# Patient Record
Sex: MALE | Race: WHITE | Employment: OTHER | URBAN - METROPOLITAN AREA
[De-identification: names, ages, dates, MRNs, and addresses within clinical notes are randomized per-mention and may not be internally consistent; named-entity substitution may affect disease eponyms.]

---

## 2017-08-08 ENCOUNTER — HOSPITAL ENCOUNTER (OUTPATIENT)
Dept: WOUND CARE | Age: 82
Discharge: HOME | End: 2017-08-08
Payer: MEDICARE

## 2017-08-08 PROCEDURE — 29580 STRAPPING UNNA BOOT: CPT

## 2017-08-08 PROCEDURE — 99201 HC NEW PT LEVEL I W PROC: CPT

## 2017-08-14 PROCEDURE — 29580 STRAPPING UNNA BOOT: CPT

## 2017-08-21 PROCEDURE — 29580 STRAPPING UNNA BOOT: CPT

## 2017-08-28 PROCEDURE — 29580 STRAPPING UNNA BOOT: CPT

## 2017-09-06 ENCOUNTER — HOSPITAL ENCOUNTER (OUTPATIENT)
Dept: WOUND CARE | Age: 82
Discharge: HOME | End: 2017-09-06
Payer: MEDICARE

## 2017-09-06 PROCEDURE — 29580 STRAPPING UNNA BOOT: CPT

## 2017-09-13 PROCEDURE — 29580 STRAPPING UNNA BOOT: CPT

## 2017-09-20 PROCEDURE — 97602 WOUND(S) CARE NON-SELECTIVE: CPT

## 2017-09-21 ENCOUNTER — HOSPITAL ENCOUNTER (EMERGENCY)
Age: 82
Discharge: HOME | End: 2017-09-22
Attending: EMERGENCY MEDICINE
Payer: MEDICARE

## 2017-09-21 DIAGNOSIS — S30.0XXA CONTUSION OF LOWER BACK, INITIAL ENCOUNTER: ICD-10-CM

## 2017-09-21 DIAGNOSIS — R55 SYNCOPE AND COLLAPSE: Primary | ICD-10-CM

## 2017-09-21 DIAGNOSIS — I89.0 LYMPHEDEMA: ICD-10-CM
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
## 2017-09-21 PROCEDURE — 82550 ASSAY OF CK (CPK): CPT | Performed by: EMERGENCY MEDICINE

## 2017-09-21 PROCEDURE — 85025 COMPLETE CBC W/AUTO DIFF WBC: CPT | Performed by: EMERGENCY MEDICINE

## 2017-09-21 PROCEDURE — 99284 EMERGENCY DEPT VISIT MOD MDM: CPT

## 2017-09-21 PROCEDURE — 93005 ELECTROCARDIOGRAM TRACING: CPT

## 2017-09-21 PROCEDURE — 80053 COMPREHEN METABOLIC PANEL: CPT | Performed by: EMERGENCY MEDICINE

## 2017-09-22 ENCOUNTER — APPOINTMENT (OUTPATIENT)
Dept: CT IMAGING | Age: 82
End: 2017-09-22
Attending: EMERGENCY MEDICINE
Payer: MEDICARE

## 2017-09-22 VITALS
HEART RATE: 88 BPM | SYSTOLIC BLOOD PRESSURE: 131 MMHG | TEMPERATURE: 97.6 F | WEIGHT: 215 LBS | OXYGEN SATURATION: 96 % | HEIGHT: 74 IN | BODY MASS INDEX: 27.59 KG/M2 | DIASTOLIC BLOOD PRESSURE: 69 MMHG | RESPIRATION RATE: 18 BRPM

## 2017-09-22 LAB
ALBUMIN SERPL-MCNC: 3.1 G/DL (ref 3.4–5)
ALBUMIN/GLOB SERPL: 0.8 {RATIO} (ref 0.8–1.7)
ALP SERPL-CCNC: 71 U/L (ref 45–117)
ALT SERPL-CCNC: 15 U/L (ref 16–61)
ANION GAP SERPL CALC-SCNC: 7 MMOL/L (ref 3–18)
AST SERPL-CCNC: 19 U/L (ref 15–37)
BASOPHILS # BLD: 0 K/UL (ref 0–0.1)
BASOPHILS NFR BLD: 0 % (ref 0–3)
BILIRUB SERPL-MCNC: 0.9 MG/DL (ref 0.2–1)
BUN SERPL-MCNC: 18 MG/DL (ref 7–18)
BUN/CREAT SERPL: 20 (ref 12–20)
CALCIUM SERPL-MCNC: 8.8 MG/DL (ref 8.5–10.1)
CHLORIDE SERPL-SCNC: 103 MMOL/L (ref 100–108)
CK MB CFR SERPL CALC: ABNORMAL % (ref 0–4)
CK MB SERPL-MCNC: <1 NG/ML (ref 5–25)
CK SERPL-CCNC: 23 U/L (ref 39–308)
CO2 SERPL-SCNC: 29 MMOL/L (ref 21–32)
CREAT SERPL-MCNC: 0.88 MG/DL (ref 0.6–1.3)
DIFFERENTIAL METHOD BLD: ABNORMAL
EOSINOPHIL # BLD: 0.1 K/UL (ref 0–0.4)
EOSINOPHIL NFR BLD: 1 % (ref 0–5)
ERYTHROCYTE [DISTWIDTH] IN BLOOD BY AUTOMATED COUNT: 13.2 % (ref 11.6–14.5)
GLOBULIN SER CALC-MCNC: 3.8 G/DL (ref 2–4)
GLUCOSE SERPL-MCNC: 119 MG/DL (ref 74–99)
HCT VFR BLD AUTO: 37.8 % (ref 36–48)
HGB BLD-MCNC: 12.8 G/DL (ref 13–16)
LYMPHOCYTES # BLD: 0.8 K/UL (ref 0.8–3.5)
LYMPHOCYTES NFR BLD: 6 % (ref 20–51)
MCH RBC QN AUTO: 34.3 PG (ref 24–34)
MCHC RBC AUTO-ENTMCNC: 33.9 G/DL (ref 31–37)
MCV RBC AUTO: 101.3 FL (ref 74–97)
MONOCYTES # BLD: 0.9 K/UL (ref 0–1)
MONOCYTES NFR BLD: 7 % (ref 2–9)
NEUTS SEG # BLD: 11.3 K/UL (ref 1.8–8)
NEUTS SEG NFR BLD: 86 % (ref 42–75)
PLATELET # BLD AUTO: 204 K/UL (ref 135–420)
PMV BLD AUTO: 10.3 FL (ref 9.2–11.8)
POTASSIUM SERPL-SCNC: 4.2 MMOL/L (ref 3.5–5.5)
PROT SERPL-MCNC: 6.9 G/DL (ref 6.4–8.2)
RBC # BLD AUTO: 3.73 M/UL (ref 4.7–5.5)
RBC MORPH BLD: ABNORMAL
SODIUM SERPL-SCNC: 139 MMOL/L (ref 136–145)
TROPONIN I SERPL-MCNC: <0.02 NG/ML (ref 0–0.06)
WBC # BLD AUTO: 13.1 K/UL (ref 4.6–13.2)

## 2017-09-22 PROCEDURE — 74011636320 HC RX REV CODE- 636/320: Performed by: EMERGENCY MEDICINE

## 2017-09-22 PROCEDURE — 70450 CT HEAD/BRAIN W/O DYE: CPT

## 2017-09-22 PROCEDURE — 71275 CT ANGIOGRAPHY CHEST: CPT

## 2017-09-22 RX ORDER — ACETAMINOPHEN 325 MG/1
650 TABLET ORAL
Qty: 20 TAB | Refills: 0 | Status: SHIPPED | OUTPATIENT
Start: 2017-09-22

## 2017-09-22 RX ADMIN — IOPAMIDOL 100 ML: 755 INJECTION, SOLUTION INTRAVENOUS at 02:17

## 2017-09-22 NOTE — DISCHARGE INSTRUCTIONS
Bruises: Care Instructions  Your Care Instructions    Bruises occur when small blood vessels under the skin tear or rupture, most often from a twist, bump, or fall. Blood leaks into tissues under the skin and causes a black-and-blue spot that often turns colors, including purplish black, reddish blue, or yellowish green, as the bruise heals. Bruises hurt, but most are not serious and will go away on their own within 2 to 4 weeks. Sometimes, gravity causes them to spread down the body. A leg bruise usually will take longer to heal than a bruise on the face or arms. Follow-up care is a key part of your treatment and safety. Be sure to make and go to all appointments, and call your doctor if you are having problems. Its also a good idea to know your test results and keep a list of the medicines you take. How can you care for yourself at home? · Take pain medicines exactly as directed. ¨ If the doctor gave you a prescription medicine for pain, take it as prescribed. ¨ If you are not taking a prescription pain medicine, ask your doctor if you can take an over-the-counter medicine. · Put ice or a cold pack on the area for 10 to 20 minutes at a time. Put a thin cloth between the ice and your skin. · If you can, prop up the bruised area on pillows as much as possible for the next few days. Try to keep the bruise above the level of your heart. When should you call for help? Call your doctor now or seek immediate medical care if:  · You have signs of infection, such as:  ¨ Increased pain, swelling, warmth, or redness. ¨ Red streaks leading from the bruise. ¨ Pus draining from the bruise. ¨ A fever. · You have a bruise on your leg and signs of a blood clot, such as:  ¨ Increasing redness and swelling along with warmth, tenderness, and pain in the bruised area. ¨ Pain in your calf, back of the knee, thigh, or groin. ¨ Redness and swelling in your leg or groin. · Your pain gets worse.   Watch closely for changes in your health, and be sure to contact your doctor if:  · You do not get better as expected. Where can you learn more? Go to http://stephanie-florentin.info/. Enter [phone] in the search box to learn more about \"Bruises: Care Instructions. \"  Current as of: March 20, 2017  Content Version: 11.3  © 7759-9646 [employer]. Care instructions adapted under license by [employer] (which disclaims liability or warranty for this information). If you have questions about a medical condition or this instruction, always ask your healthcare professional. [place] [address] any warranty or liability for your use of this information. Lymphedema: Care Instructions  Your Care Instructions  Lymphedema is fluid that builds up in the arms or legs. It is often caused by surgery to remove lymph nodes during cancer treatment, especially breast cancer surgery, which can cause fluid to build up in the arm. It can happen after radiation treatment to an area that involves lymph nodes. It also can be caused by a fractured bone or surgery to fix a fracture. And some medicines also can cause lymphedema. Some people get it for unknown reasons. Normally, lymph nodes trap bacteria and other substances as fluid flows through them. Then, the white cells in the body's defense, or immune, system can destroy the substances. But if there are few or no lymph nodes--or if the lymph system in an arm or leg has been damaged--fluid can build up in the affected arm or leg. You can take simple steps at home to help treat or prevent fluid buildup. Treatment may include raising the arm or leg to let gravity drain the fluid. You also can wear compression stockings or sleeves. Follow-up care is a key part of your treatment and safety. Be sure to make and go to all appointments, and call your doctor if you are having problems.  Its also a good idea to know your test results and keep a list of the medicines you take. How can you care for yourself at home? · Wear a compression stocking or sleeve as your doctor suggests. It can help keep fluid from pooling in an arm or leg. Wear it during air travel. · Prop up the swollen arm or leg on a pillow anytime you sit or lie down. Try to keep it above the level of your heart. This will help reduce swelling. · Avoid crossing your legs if your legs are swollen. · Get some exercise on most days of the week. Increase the intensity of exercise slowly. Water aerobics can help reduce swelling by helping fluid move around. Wear your compression stocking or sleeve during exercise, but not during water exercise. · See a physical therapist. He or she can teach you how to do self-massage to help fluid move around. You also can learn what activities would be best for you. · Keep your feet clean and wear clean socks or stockings every day. Check your feet often for signs of infection, such as redness or heat. Do not walk barefoot. · If you have had lymph nodes removed from under your arm:  ¨ Do not have blood drawn from the arm on the side of the lymph node surgery. ¨ Do not allow a blood pressure cuff to be placed on that arm. If you are in the hospital, make sure your nurse and other hospital staff know of your condition. ¨ Wear gloves when gardening or doing other activities that may lead to cuts on your fingers or hands. · If you have had lymph nodes removed from your groin:  ¨ Bathe your feet daily in lukewarm, not hot, water. Use a mild soap that has a moisturizer, or use a moisturizer separately. ¨ Check your feet for blisters or cuts. ¨ Wear comfortable and supportive shoes that fit properly. ¨ Wear the correct size of panty hose and stockings. Avoid garters or knee-high or thigh-high stockings. · Ask your doctor how to treat any cuts, scratches, insect bites, or other injuries that may occur.   · Use sunscreen and insect repellent when outdoors to protect your skin from sunburn and insect bites. · Wear medical alert jewelry that says you have lymphedema. You can buy these at most drugstores and on the Internet. When should you call for help? Call your doctor now or seek immediate medical care if:  · You have signs of infection, such as:  ¨ Increased pain, swelling, warmth, or redness. ¨ Red streaks leading from the area of lymph node surgery or radiation. ¨ Pus draining from the area of surgery or radiation. ¨ A fever. · You have a feeling of tightness or swelling in or around your arm, hand, leg, or foot. · You have pain, weakness that keeps getting worse, or a \"pins-and-needles\" feeling. Watch closely for changes in your health, and be sure to contact your doctor if:  · You continue to have fluid buildup even with home treatment. Where can you learn more? Go to http://stephanieCollision Hubflorentin.info/. Enter V398 in the search box to learn more about \"Lymphedema: Care Instructions. \"  Current as of: July 26, 2016  Content Version: 11.3  © 0828-6487 [employer]. Care instructions adapted under license by [email or web address] (which disclaims liability or warranty for this information). If you have questions about a medical condition or this instruction, always ask your healthcare professional. [place] [address] any warranty or liability for your use of this information. Fainting: Care Instructions  Your Care Instructions    When you faint, or pass out, you lose consciousness for a short time. A brief drop in blood flow to the brain often causes it. When you fall or lie down, more blood flows to your brain and you regain consciousness. Emotional stress, pain, or overheating--especially if you have been standing--can make you faint. In these cases, fainting is usually not serious. But fainting can be a sign of a more serious problem. Your doctor may want you to have more tests to rule out other causes.   The treatment you need depends on the reason why you fainted. The doctor has checked you carefully, but problems can develop later. If you notice any problems or new symptoms, get medical treatment right away. Follow-up care is a key part of your treatment and safety. Be sure to make and go to all appointments, and call your doctor if you are having problems. It's also a good idea to know your test results and keep a list of the medicines you take. How can you care for yourself at home? · Drink plenty of fluids to prevent dehydration. If you have kidney, heart, or liver disease and have to limit fluids, talk with your doctor before you increase your fluid intake. When should you call for help? Call 911 anytime you think you may need emergency care. For example, call if:  · You have symptoms of a heart problem. These may include:  ¨ Chest pain or pressure. ¨ Severe trouble breathing. ¨ A fast or irregular heartbeat. ¨ Lightheadedness or sudden weakness. ¨ Coughing up pink, foamy mucus. ¨ Passing out. After you call 911, the  may tell you to chew 1 adult-strength or 2 to 4 low-dose aspirin. Wait for an ambulance. Do not try to drive yourself. · You have symptoms of a stroke. These may include:  ¨ Sudden numbness, tingling, weakness, or loss of movement in your face, arm, or leg, especially on only one side of your body. ¨ Sudden vision changes. ¨ Sudden trouble speaking. ¨ Sudden confusion or trouble understanding simple statements. ¨ Sudden problems with walking or balance. ¨ A sudden, severe headache that is different from past headaches. · You passed out (lost consciousness) again. Watch closely for changes in your health, and be sure to contact your doctor if:  · You do not get better as expected. Where can you learn more? Go to http://stephanie-florentin.info/. Enter B821 in the search box to learn more about \"Fainting: Care Instructions. \"  Current as of: March 20, 2017  Content Version: 11.3  © 4841-1515 [email or web address]. Care instructions adapted under license by [employer] (which disclaims liability or warranty for this information). If you have questions about a medical condition or this instruction, always ask your healthcare professional. [address] any warranty or liability for your use of this information.

## 2017-09-22 NOTE — ED NOTES
Patient up to the bathroom, shuffled gait with small steps. Pt returned to bed and placed on cardiac monitor.

## 2017-09-22 NOTE — ED TRIAGE NOTES
Patient states that he is seen at the wound clinic and states that the wraps on his legs are too tight. Per son, patient was in the backyard and was found by a neighbor. Believed to have had a syncopal episode. Unaware if patient hit head. Sepsis Screening completed    (  )Patient meets SIRS criteria. ( x )Patient does not meet SIRS criteria.       SIRS Criteria is achieved when two or more of the following are present   Temperature < 96.8°F (36°C) or > 100.9°F (38.3°C)   Heart Rate > 90 beats per minute   Respiratory Rate > 20 breaths per minute   WBC count > 12,000 or <4,000 or > 10% bands

## 2017-09-22 NOTE — ED PROVIDER NOTES
HPI Comments: 12:31 AM    Marj López is a 80 y.o. male with pertinent PMHx of hypercholesteremia and skin CA presenting ambulatory to the ED c/o unwitnessed syncopal episode x this afternoon. Pt was found laying in his backyard by his neighbor. Pt was acting normally earlier today. Pt has been unable to walk independently since his fall. Pt's son states that the pt has been \"shuffling\" and complaining of no feeling in his legs. Pt states that he believes his sxs are due to his \"legs being rapped too tight\", left > right, as he is currently in would care for founds to bilateral legs. Pt has the dressings re-placed once per week. Pt denies any history of cardiac disease, stroke or heart failure. Pt is unsure of his family hx. Pt specifically denies any CP, SOB, N/V/D, headache or fever/chills. PCP: Luis Barrientos MD  Social Hx: - tobacco use, - alcohol use, - illicit drug use    There are no other complaints, changes, or physical findings at this time. The history is provided by the patient. No  was used. Past Medical History:   Diagnosis Date    Arthritis     Cancer ([facility] [address]     skin cancer, squamous cell    H/O seasonal allergies     Hypercholesteremia        Past Surgical History:   Procedure Laterality Date    HX CATARACT REMOVAL Bilateral     HX HERNIA REPAIR Bilateral 2007    HX TONSILLECTOMY           History reviewed. No pertinent family history. Social History     Social History    Marital status:      Spouse name: N/A    Number of children: N/A    Years of education: N/A     Occupational History    Not on file.      Social History Main Topics    Smoking status: Former Smoker     Quit date: 7/15/2005    Smokeless tobacco: Never Used    Alcohol use No    Drug use: No    Sexual activity: Not Currently     Other Topics Concern    Not on file     Social History Narrative         ALLERGIES: Cephalexin    Review of Systems   Constitutional: Negative for chills and fever. Respiratory: Negative for shortness of breath. Cardiovascular: Negative for chest pain. Gastrointestinal: Negative for nausea and vomiting. Skin: Positive for wound (bilateral lower legs). Neurological: Positive for syncope. Negative for headaches. All other systems reviewed and are negative. Vitals:    09/21/17 2330 09/22/17 0015 09/22/17 0045 09/22/17 0115   BP:       Pulse: 89 98 94 92   Resp: 18 21 18 13   Temp:       SpO2:  96% 97% 98%   Weight:       Height:                Physical Exam   Constitutional: He is oriented to person, place, and time. He appears well-developed and well-nourished. Elderly gentleman who is comfortable and well appearing. Nontoxic. HENT:   Head: Normocephalic and atraumatic. Eyes: EOM are normal. Pupils are equal, round, and reactive to light. Right conjunctiva is injected. No scleral icterus. No pallor   Neck: Normal range of motion. Neck supple. Cardiovascular: Normal rate, normal heart sounds and intact distal pulses. All toes initially with delayed cap refill of ~5 seconds (after dressing was removed, cap refill was 2 seconds). Pulmonary/Chest: Effort normal and breath sounds normal. No respiratory distress. Abdominal: Soft. Bowel sounds are normal. He exhibits no distension. There is no tenderness. Musculoskeletal: He exhibits edema. LOWER EXTREMITIES  Bilateral lower extremity edema with trace erythema to the anterior surface of the left leg. Large patch of erythema to the anterior portion of the right leg, as well as a small patch to the left lateral and prominent posterior calf. There is no drainage or malodor. Neurological: He is alert and oriented to person, place, and time. Skin: Skin is warm and dry. No rash noted. [place] [facility] [place]   Psychiatric: He has a normal mood and affect. His behavior is normal.   Nursing note and vitals reviewed.      RESULTS:    CARDIAC MONITOR NOTE:  Cardiac Rhythm: NSR  Rate: 93 bpm     PULSE OXIMETRY NOTE:  Pulse-ox is 96% on RA  Interpretation: NML       EKG interpretation: (Preliminary)  NSR at 87 bpm. Nml ST segments. No arrhythmia. EKG read by Chun. August Gill MD at [phone]     CT Results  (Last 48 hours)               09/22/17 0240  CTA CHEST W OR W WO CONT Final result    Impression:  IMPRESSION:       1. No evidence of pulmonary embolism. 2.  Diffuse mild bilateral interstitial coarsening throughout the lung fields   most consistent with chronic interstitial lung disease. 3. No focal consolidation suggests an active infiltrate. 4. Cholelithiasis. 5. Posterior right seventh rib fracture of appears to be old or subacute. Narrative:  EXAM: CTA chest       INDICATION: Chest pain. COMPARISON: None       TECHNIQUE: Axial CT imaging from the thoracic inlet through the diaphragm with   intravenous contrast. Coronal and sagittal MIP reformats were generated. Dose   reduction techniques used: automated exposure control, adjustment of the mAs   and/or kVp according to patient size, and iterative reconstruction techniques. _______________       FINDINGS:       EXAM QUALITY: Adequate. PULMONARY ARTERIES: No evidence of pulmonary embolism. MEDIASTINUM: Normal heart size. No evidence of right heart strain. There is   atherosclerotic plaque throughout the thoracic aorta. No pericardial effusion. LUNGS: There is diffuse mild bilateral interstitial coarsening most pronounced   peripherally and the lung bases. PLEURA: Normal.       AIRWAY: Normal.       LYMPH NODES: No enlarged nodes. UPPER ABDOMEN: 3.6 cm gallstone is noted. OTHER: No acute or aggressive osseous abnormalities identified. There is a   posterior right seventh rib fracture. There is osteopenia and degenerative   change throughout the lumbar spine.  There is a 1.1 cm left thyroid low-density   nodule.       _______________ 09/22/17 0236  CT HEAD WO CONT Final result    Impression:  IMPRESSION:   Degraded study. Mild cerebral volume loss and periventricular diminished attenuation consistent   with microvascular disease. 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 No acute intracranial abnormality identified. Narrative:  EXAM: CT head       INDICATION: Syncope. COMPARISON: None. TECHNIQUE: Axial CT imaging of the head was performed without intravenous   contrast. Dose reduction techniques used: automated exposure control, adjustment   of the mAs and/or kVp according to patient size, and iterative reconstruction   techniques. _______________       FINDINGS: Slightly limits evaluation particularly at the skull base. BRAIN AND POSTERIOR FOSSA: There is mild cerebral volume loss with prominence of   the lateral and the third ventricles. The cortical sulci are widened   appropriately. There is mild periventricular and central white matter areas of   diminished attenuation. There is a small area of encephalomalacia posterior   right frontal lobe. No acute territorial defect is seen. There is no acute   hemorrhage. EXTRA-AXIAL SPACES AND MENINGES: There are no abnormal extra-axial fluid   collections. CALVARIUM: Intact. SINUSES: Clear. OTHER: None.       _______________                  Labs Reviewed   CBC WITH AUTOMATED DIFF - Abnormal; Notable for the following:        Result Value    RBC 3.73 (*)     HGB 12.8 (*)     .3 (*)     MCH 34.3 (*)     NEUTROPHILS 86 (*)     LYMPHOCYTES 6 (*)     ABS.  NEUTROPHILS 11.3 (*)     All other components within normal limits   METABOLIC PANEL, COMPREHENSIVE - Abnormal; Notable for the following:     Glucose 119 (*)     ALT (SGPT) 15 (*)     Albumin 3.1 (*)     All other components within normal limits   CARDIAC PANEL,(CK, CKMB & TROPONIN) - Abnormal; Notable for the following:     CK 23 (*)     All other components within normal limits       Recent Results (from the past 12 hour(s))   EKG, 12 LEAD, INITIAL    Collection Time: 09/21/17 11:22 PM   Result Value Ref Range    Ventricular Rate 87 BPM    Atrial Rate 87 BPM    P-R Interval 192 ms    QRS Duration 88 ms    Q-T Interval 360 ms    QTC Calculation (Bezet) 433 ms    Calculated P Axis 64 degrees    Calculated R Axis -29 degrees    Calculated T Axis 38 degrees    Diagnosis       Normal sinus rhythm  Normal ECG  When compared with ECG of 15-JUL-2016 12:54,  premature ventricular complexes are no longer present     CBC WITH AUTOMATED DIFF    Collection Time: 09/21/17 11:30 PM   Result Value Ref Range    WBC 13.1 4.6 - 13.2 K/uL    RBC 3.73 (L) 4.70 - 5.50 M/uL    HGB 12.8 (L) 13.0 - 16.0 g/dL    HCT 37.8 36.0 - 48.0 %    .3 (H) 74.0 - 97.0 FL    MCH 34.3 (H) 24.0 - 34.0 PG    MCHC 33.9 31.0 - 37.0 g/dL    RDW 13.2 11.6 - 14.5 %    PLATELET 847 445 - 074 K/uL    MPV 10.3 9.2 - 11.8 FL    NEUTROPHILS 86 (H) 42 - 75 %    LYMPHOCYTES 6 (L) 20 - 51 %    MONOCYTES 7 2 - 9 %    EOSINOPHILS 1 0 - 5 %    BASOPHILS 0 0 - 3 %    ABS. NEUTROPHILS 11.3 (H) 1.8 - 8.0 K/UL    ABS. LYMPHOCYTES 0.8 0.8 - 3.5 K/UL    ABS. MONOCYTES 0.9 0 - 1.0 K/UL    ABS. EOSINOPHILS 0.1 0.0 - 0.4 K/UL    ABS.  BASOPHILS 0.0 0.0 - 0.1 K/UL    RBC COMMENTS MACROCYTOSIS  FEW  POLYCHROMASIA  SLIGHT        DF MANUAL     METABOLIC PANEL, COMPREHENSIVE    Collection Time: 09/21/17 11:30 PM   Result Value Ref Range    Sodium 139 136 - 145 mmol/L    Potassium 4.2 3.5 - 5.5 mmol/L    Chloride 103 100 - 108 mmol/L    CO2 29 21 - 32 mmol/L    Anion gap 7 3.0 - 18 mmol/L    Glucose 119 (H) 74 - 99 mg/dL    BUN 18 7.0 - 18 MG/DL    Creatinine 0.88 0.6 - 1.3 MG/DL    BUN/Creatinine ratio 20 12 - 20      GFR est AA >60 >60 ml/min/1.73m2    GFR est non-AA >60 >60 ml/min/1.73m2    Calcium 8.8 8.5 - 10.1 MG/DL    Bilirubin, total 0.9 0.2 - 1.0 MG/DL    ALT (SGPT) 15 (L) 16 - 61 U/L    AST (SGOT) 19 15 - 37 U/L    Alk. phosphatase 71 45 - 117 U/L    Protein, total 6.9 6.4 - 8.2 g/dL    Albumin 3.1 (L) 3.4 - 5.0 g/dL    Globulin 3.8 2.0 - 4.0 g/dL    A-G Ratio 0.8 0.8 - 1.7     CARDIAC PANEL,(CK, CKMB & TROPONIN)    Collection Time: 09/21/17 11:30 PM   Result Value Ref Range    CK 23 (L) 39 - 308 U/L    CK - MB <1.0 <3.6 ng/ml    CK-MB Index  0.0 - 4.0 %     CALCULATION NOT PERFORMED WHEN RESULT IS BELOW LINEAR LIMIT    Troponin-I, Qt. <0.02 0.00 - 0.06 NG/ML        MDM  Number of Diagnoses or Management Options  Diagnosis management comments: Syncope associated with leg discomfort. Should consider PE and stroke or TIA as most concerning. More likely leg weakness. But he is amnestic so further work up is inducted. Amount and/or Complexity of Data Reviewed  Clinical lab tests: ordered and reviewed  Tests in the radiology section of CPT®: ordered and reviewed (CT head  CTA chest)  Tests in the medicine section of CPT®: ordered and reviewed      ED Course     Medications   iopamidol (ISOVUE-370) 76 % injection 100 mL (100 mL IntraVENous Given 9/22/17 0217)        Procedures    PROGRESS NOTE:  12:31 AM  Initial assessment performed. Written by Prudencio Cruz ED Scribe, as dictated by Don Clarke MD.    PROGRESS NOTE:  4:20 AM  Pt and/or family have been updated on their results. Pt and/or pt's family are aware of the plan of care and are in agreement. Written by Tita Barth ED Scribe, as dictated by Don Clarke MD.      DISCHARGE NOTE:  4:26 AM  The patient is ready for discharge. The patient's signs, symptoms, diagnosis, and discharge instructions have been discussed and the patient and/or family has conveyed their understanding. The patient and/or family is to follow up as recommended or return to the ER should their symptoms worsen. Plan has been discussed and the patient and/or family is in agreement. Written by JULIANA Carrollibe, as dictated by Candi Sharma. Shashi Barnes MD.     CLINICAL IMPRESSION:  1. Syncope and collapse    2. Fall, initial encounter    3. Lymphedema    4. Contusion of lower back, initial encounter        PLAN:  1. D/C Home    2. Current Discharge Medication List      START taking these medications    Details   acetaminophen (TYLENOL) 325 mg tablet Take 2 Tabs by mouth every four (4) hours as needed for Pain. Qty: 20 Tab, Refills: 0             3.   Follow-up Information     Follow up With Details Comments Contact Info    Kavya Canela MD Schedule an appointment as soon as possible for a visit for primary care follow up, as needed [place]  [id number] [facility] EMERGENCY DEPT  As needed, If symptoms worsen [address]  [phone]          SCRIBE ATTESTATION:  This note is prepared by Daryn Randall, acting as Scribe for Candi Sharma. Shashi Barnes MD.    PROVIDER ATTESTATION:  Candi Sharma. Shashi Barnes MD: The scribe's documentation has been prepared under my direction and personally reviewed by me in its entirety. I confirm that the note above accurately reflects all work, treatment, procedures, and medical decision making performed by me.

## 2017-09-24 LAB
ATRIAL RATE: 87 BPM
CALCULATED P AXIS, ECG09: 64 DEGREES
CALCULATED R AXIS, ECG10: -29 DEGREES
CALCULATED T AXIS, ECG11: 38 DEGREES
DIAGNOSIS, 93000: NORMAL
P-R INTERVAL, ECG05: 192 MS
Q-T INTERVAL, ECG07: 360 MS
QRS DURATION, ECG06: 88 MS
QTC CALCULATION (BEZET), ECG08: 433 MS
VENTRICULAR RATE, ECG03: 87 BPM

## 2017-09-27 PROCEDURE — 29580 STRAPPING UNNA BOOT: CPT

## 2017-10-04 ENCOUNTER — HOSPITAL ENCOUNTER (OUTPATIENT)
Dept: WOUND CARE | Age: 82
Discharge: HOME | End: 2017-10-04
Payer: MEDICARE

## 2017-10-04 PROCEDURE — 29580 STRAPPING UNNA BOOT: CPT

## 2017-10-11 PROCEDURE — 29580 STRAPPING UNNA BOOT: CPT

## 2017-10-18 PROCEDURE — 97602 WOUND(S) CARE NON-SELECTIVE: CPT

## 2018-03-09 ENCOUNTER — HOSPITAL ENCOUNTER (OUTPATIENT)
Dept: WOUND CARE | Age: 83
Discharge: HOME | End: 2018-03-09
Payer: MEDICARE

## 2018-03-12 PROCEDURE — 29580 STRAPPING UNNA BOOT: CPT

## 2018-03-15 PROCEDURE — 29580 STRAPPING UNNA BOOT: CPT

## 2018-03-22 ENCOUNTER — HOSPITAL ENCOUNTER (OUTPATIENT)
Dept: WOUND CARE | Age: 83
Discharge: HOME | End: 2018-03-22
Payer: MEDICARE

## 2018-03-22 VITALS
OXYGEN SATURATION: 100 % | RESPIRATION RATE: 18 BRPM | HEART RATE: 65 BPM | DIASTOLIC BLOOD PRESSURE: 54 MMHG | SYSTOLIC BLOOD PRESSURE: 124 MMHG | TEMPERATURE: 97.5 F

## 2018-03-22 PROCEDURE — 29580 STRAPPING UNNA BOOT: CPT

## 2018-03-29 ENCOUNTER — HOSPITAL ENCOUNTER (OUTPATIENT)
Dept: WOUND CARE | Age: 83
Discharge: HOME | End: 2018-03-29
Payer: MEDICARE

## 2018-03-29 VITALS
SYSTOLIC BLOOD PRESSURE: 116 MMHG | DIASTOLIC BLOOD PRESSURE: 59 MMHG | TEMPERATURE: 97.6 F | HEART RATE: 76 BPM | RESPIRATION RATE: 20 BRPM | OXYGEN SATURATION: 100 %

## 2018-03-29 PROCEDURE — 29580 STRAPPING UNNA BOOT: CPT

## 2018-04-05 ENCOUNTER — HOSPITAL ENCOUNTER (OUTPATIENT)
Dept: WOUND CARE | Age: 83
Discharge: HOME | End: 2018-04-05
Payer: MEDICARE

## 2018-04-05 VITALS
OXYGEN SATURATION: 98 % | HEART RATE: 67 BPM | SYSTOLIC BLOOD PRESSURE: 134 MMHG | TEMPERATURE: 97.4 F | DIASTOLIC BLOOD PRESSURE: 58 MMHG | RESPIRATION RATE: 18 BRPM

## 2018-04-13 ENCOUNTER — HOSPITAL ENCOUNTER (OUTPATIENT)
Dept: WOUND CARE | Age: 83
Discharge: HOME | End: 2018-04-13
Payer: MEDICARE

## 2018-04-13 VITALS
HEART RATE: 66 BPM | OXYGEN SATURATION: 99 % | SYSTOLIC BLOOD PRESSURE: 110 MMHG | DIASTOLIC BLOOD PRESSURE: 49 MMHG | TEMPERATURE: 94.2 F

## 2018-04-13 PROCEDURE — 99211 OFF/OP EST MAY X REQ PHY/QHP: CPT

## 2018-05-17 ENCOUNTER — HOSPITAL ENCOUNTER (OUTPATIENT)
Dept: WOUND CARE | Age: 83
Discharge: HOME | End: 2018-05-17
Payer: MEDICARE

## 2018-05-17 VITALS
TEMPERATURE: 97.6 F | OXYGEN SATURATION: 100 % | RESPIRATION RATE: 18 BRPM | HEART RATE: 67 BPM | DIASTOLIC BLOOD PRESSURE: 48 MMHG | SYSTOLIC BLOOD PRESSURE: 133 MMHG

## 2018-05-17 PROCEDURE — 29580 STRAPPING UNNA BOOT: CPT

## 2018-05-22 ENCOUNTER — HOSPITAL ENCOUNTER (OUTPATIENT)
Dept: VASCULAR SURGERY | Age: 83
Discharge: HOME | End: 2018-05-22
Attending: PODIATRIST
Payer: MEDICARE

## 2018-05-22 VITALS
HEART RATE: 79 BPM | TEMPERATURE: 97.5 F | RESPIRATION RATE: 18 BRPM | OXYGEN SATURATION: 97 % | DIASTOLIC BLOOD PRESSURE: 41 MMHG | SYSTOLIC BLOOD PRESSURE: 117 MMHG

## 2018-05-22 DIAGNOSIS — L97.521 ULCER OF TOE OF LEFT FOOT, LIMITED TO BREAKDOWN OF SKIN (HCC): ICD-10-CM

## 2018-05-22 DIAGNOSIS — L97.221 NON-PRESSURE CHRONIC ULCER OF LEFT CALF, LIMITED TO BREAKDOWN OF SKIN (HCC): ICD-10-CM

## 2018-05-22 PROCEDURE — 29580 STRAPPING UNNA BOOT: CPT

## 2018-05-22 PROCEDURE — 93922 UPR/L XTREMITY ART 2 LEVELS: CPT

## 2018-05-22 NOTE — PROCEDURES
[facility]  *** FINAL REPORT ***    Name: Robbie Huston  MRN: [id number]    Outpatient  : [id number]  HIS Order #: [id number]  [id number] [facility] Visit #: [id number]  Date: 22 May 2018    TYPE OF TEST: Peripheral Arterial Testing    REASON FOR TEST  Extremity ulceration (both sides)    Right Leg  Doppler:    Abnormal  Ankle/Brachial: 0.60    Left Leg  Doppler:    Normal  Ankle/Brachial: 1.03    INTERPRETATION/FINDINGS  Physiologic testing was performed using continuous wave Doppler and  segmental pressures. 1. Moderate peripheral arterial disease indicated at rest in the right   leg, likely femoral or proximal disease. 2. Mild peripheral arterial disease at rest in the left leg. 3. The Doppler waveforms are monophasic in the right posterior tibial  and dorsalis pedis arteries. 4. The Doppler waveforms are audibly biphasic to monophasic in the  left posterior tibial and dorsalis pedis arteries. 5. The right ankle/brachial index is 0.60 and the left ankle/brachial  index is 1.03.  6. The toe pressures are 42 mmHg on the right and 44 mmHg on the left. ADDITIONAL COMMENTS    I have personally reviewed the data relevant to the interpretation of  this  study. TECHNOLOGIST: Tello Munoz  Signed: 2018 11:30 AM    PHYSICIAN: Alec French MD  Signed: 2018 03:26 PM

## 2018-05-24 ENCOUNTER — APPOINTMENT (OUTPATIENT)
Dept: WOUND CARE | Age: 83
End: 2018-05-24
Payer: MEDICARE

## 2018-05-25 ENCOUNTER — HOSPITAL ENCOUNTER (OUTPATIENT)
Dept: WOUND CARE | Age: 83
Discharge: HOME | End: 2018-05-25
Payer: MEDICARE

## 2018-05-25 PROCEDURE — 29580 STRAPPING UNNA BOOT: CPT

## 2018-06-01 ENCOUNTER — HOSPITAL ENCOUNTER (OUTPATIENT)
Dept: WOUND CARE | Age: 83
Discharge: HOME | End: 2018-06-01
Payer: MEDICARE

## 2018-06-01 PROCEDURE — 29580 STRAPPING UNNA BOOT: CPT

## 2018-06-05 ENCOUNTER — HOSPITAL ENCOUNTER (OUTPATIENT)
Dept: WOUND CARE | Age: 83
Discharge: HOME | End: 2018-06-05
Payer: MEDICARE

## 2018-06-05 VITALS
SYSTOLIC BLOOD PRESSURE: 108 MMHG | RESPIRATION RATE: 19 BRPM | OXYGEN SATURATION: 100 % | TEMPERATURE: 97.8 F | DIASTOLIC BLOOD PRESSURE: 44 MMHG | HEART RATE: 76 BPM

## 2018-06-05 PROCEDURE — 29580 STRAPPING UNNA BOOT: CPT

## 2018-06-08 ENCOUNTER — HOSPITAL ENCOUNTER (OUTPATIENT)
Dept: WOUND CARE | Age: 83
Discharge: HOME | End: 2018-06-08
Payer: MEDICARE

## 2018-06-08 VITALS
HEART RATE: 71 BPM | DIASTOLIC BLOOD PRESSURE: 65 MMHG | TEMPERATURE: 97.5 F | RESPIRATION RATE: 18 BRPM | OXYGEN SATURATION: 99 % | SYSTOLIC BLOOD PRESSURE: 116 MMHG

## 2018-06-08 PROCEDURE — 29580 STRAPPING UNNA BOOT: CPT

## 2018-06-13 ENCOUNTER — HOSPITAL ENCOUNTER (OUTPATIENT)
Dept: WOUND CARE | Age: 83
Discharge: HOME | End: 2018-06-13
Payer: MEDICARE

## 2018-06-13 VITALS
RESPIRATION RATE: 16 BRPM | TEMPERATURE: 97.4 F | HEART RATE: 72 BPM | DIASTOLIC BLOOD PRESSURE: 76 MMHG | OXYGEN SATURATION: 98 % | SYSTOLIC BLOOD PRESSURE: 121 MMHG

## 2018-06-13 PROCEDURE — 29580 STRAPPING UNNA BOOT: CPT

## 2018-06-21 ENCOUNTER — HOSPITAL ENCOUNTER (OUTPATIENT)
Dept: WOUND CARE | Age: 83
Discharge: HOME | End: 2018-06-21
Payer: MEDICARE

## 2018-06-21 VITALS
OXYGEN SATURATION: 97 % | HEART RATE: 71 BPM | SYSTOLIC BLOOD PRESSURE: 121 MMHG | RESPIRATION RATE: 16 BRPM | TEMPERATURE: 97.5 F | DIASTOLIC BLOOD PRESSURE: 48 MMHG

## 2018-06-21 PROCEDURE — 29580 STRAPPING UNNA BOOT: CPT

## 2018-06-28 ENCOUNTER — HOSPITAL ENCOUNTER (OUTPATIENT)
Dept: WOUND CARE | Age: 83
Discharge: HOME | End: 2018-06-28
Payer: MEDICARE

## 2018-06-28 VITALS
RESPIRATION RATE: 17 BRPM | TEMPERATURE: 97.6 F | HEART RATE: 73 BPM | OXYGEN SATURATION: 98 % | DIASTOLIC BLOOD PRESSURE: 40 MMHG | SYSTOLIC BLOOD PRESSURE: 106 MMHG

## 2018-06-28 PROCEDURE — 29580 STRAPPING UNNA BOOT: CPT

## 2018-07-05 ENCOUNTER — HOSPITAL ENCOUNTER (OUTPATIENT)
Dept: WOUND CARE | Age: 83
Discharge: HOME | End: 2018-07-05
Payer: MEDICARE

## 2018-07-05 VITALS
RESPIRATION RATE: 18 BRPM | DIASTOLIC BLOOD PRESSURE: 55 MMHG | TEMPERATURE: 97.5 F | HEART RATE: 63 BPM | OXYGEN SATURATION: 100 % | SYSTOLIC BLOOD PRESSURE: 115 MMHG

## 2018-07-05 PROCEDURE — 29580 STRAPPING UNNA BOOT: CPT

## 2018-07-12 ENCOUNTER — HOSPITAL ENCOUNTER (OUTPATIENT)
Dept: WOUND CARE | Age: 83
Discharge: HOME | End: 2018-07-12
Payer: MEDICARE

## 2018-07-12 VITALS
TEMPERATURE: 98 F | HEART RATE: 70 BPM | DIASTOLIC BLOOD PRESSURE: 67 MMHG | SYSTOLIC BLOOD PRESSURE: 117 MMHG | OXYGEN SATURATION: 100 % | RESPIRATION RATE: 16 BRPM

## 2018-07-12 PROCEDURE — 29580 STRAPPING UNNA BOOT: CPT

## 2018-07-19 ENCOUNTER — HOSPITAL ENCOUNTER (OUTPATIENT)
Dept: WOUND CARE | Age: 83
Discharge: HOME | End: 2018-07-19
Payer: MEDICARE

## 2018-07-19 VITALS
OXYGEN SATURATION: 100 % | RESPIRATION RATE: 17 BRPM | SYSTOLIC BLOOD PRESSURE: 115 MMHG | TEMPERATURE: 97.5 F | HEART RATE: 64 BPM | DIASTOLIC BLOOD PRESSURE: 60 MMHG

## 2018-07-19 PROCEDURE — 29580 STRAPPING UNNA BOOT: CPT

## 2018-07-23 ENCOUNTER — HOSPITAL ENCOUNTER (OUTPATIENT)
Dept: WOUND CARE | Age: 83
Discharge: HOME | End: 2018-07-23
Payer: MEDICARE

## 2018-07-23 PROCEDURE — 29580 STRAPPING UNNA BOOT: CPT

## 2018-07-26 ENCOUNTER — HOSPITAL ENCOUNTER (OUTPATIENT)
Dept: WOUND CARE | Age: 83
Discharge: HOME | End: 2018-07-26
Payer: MEDICARE

## 2018-07-26 PROCEDURE — 29580 STRAPPING UNNA BOOT: CPT

## 2018-08-02 ENCOUNTER — HOSPITAL ENCOUNTER (OUTPATIENT)
Dept: WOUND CARE | Age: 83
Discharge: HOME | End: 2018-08-02
Payer: MEDICARE

## 2018-08-02 VITALS
HEART RATE: 75 BPM | RESPIRATION RATE: 18 BRPM | TEMPERATURE: 97.7 F | OXYGEN SATURATION: 97 % | SYSTOLIC BLOOD PRESSURE: 120 MMHG | DIASTOLIC BLOOD PRESSURE: 42 MMHG

## 2018-08-02 PROCEDURE — 29580 STRAPPING UNNA BOOT: CPT

## 2018-08-09 ENCOUNTER — HOSPITAL ENCOUNTER (OUTPATIENT)
Dept: WOUND CARE | Age: 83
Discharge: HOME | End: 2018-08-09
Payer: MEDICARE

## 2018-08-09 PROCEDURE — 29580 STRAPPING UNNA BOOT: CPT

## 2018-08-17 ENCOUNTER — HOSPITAL ENCOUNTER (OUTPATIENT)
Dept: WOUND CARE | Age: 83
Discharge: HOME | End: 2018-08-17
Payer: MEDICARE

## 2018-08-17 VITALS
TEMPERATURE: 97.3 F | SYSTOLIC BLOOD PRESSURE: 105 MMHG | HEART RATE: 76 BPM | OXYGEN SATURATION: 96 % | DIASTOLIC BLOOD PRESSURE: 54 MMHG | RESPIRATION RATE: 16 BRPM

## 2018-08-17 PROCEDURE — 29580 STRAPPING UNNA BOOT: CPT

## 2018-08-23 ENCOUNTER — HOSPITAL ENCOUNTER (OUTPATIENT)
Dept: WOUND CARE | Age: 83
Discharge: HOME | End: 2018-08-23
Payer: MEDICARE

## 2018-08-23 PROCEDURE — 29580 STRAPPING UNNA BOOT: CPT

## 2018-08-30 ENCOUNTER — HOSPITAL ENCOUNTER (OUTPATIENT)
Dept: WOUND CARE | Age: 83
Discharge: HOME | End: 2018-08-30
Payer: MEDICARE

## 2018-08-30 PROCEDURE — 87186 SC STD MICRODIL/AGAR DIL: CPT | Performed by: PODIATRIST

## 2018-08-30 PROCEDURE — 87205 SMEAR GRAM STAIN: CPT | Performed by: PODIATRIST

## 2018-08-30 PROCEDURE — 29580 STRAPPING UNNA BOOT: CPT

## 2018-08-30 PROCEDURE — 87075 CULTR BACTERIA EXCEPT BLOOD: CPT | Performed by: PODIATRIST

## 2018-08-30 PROCEDURE — 87077 CULTURE AEROBIC IDENTIFY: CPT | Performed by: PODIATRIST

## 2018-08-30 PROCEDURE — 87076 CULTURE ANAEROBE IDENT EACH: CPT | Performed by: PODIATRIST

## 2018-09-02 LAB
BACTERIA SPEC CULT: ABNORMAL
GRAM STN SPEC: ABNORMAL
SERVICE CMNT-IMP: ABNORMAL

## 2018-09-03 LAB
BACTERIA SPEC CULT: ABNORMAL
SERVICE CMNT-IMP: ABNORMAL

## 2018-09-06 ENCOUNTER — HOSPITAL ENCOUNTER (OUTPATIENT)
Dept: WOUND CARE | Age: 83
Discharge: HOME | End: 2018-09-06
Payer: MEDICARE

## 2018-09-06 PROCEDURE — 29581 APPL MULTLAYER CMPRN SYS LEG: CPT

## 2018-09-13 ENCOUNTER — HOSPITAL ENCOUNTER (OUTPATIENT)
Dept: WOUND CARE | Age: 83
Discharge: HOME | End: 2018-09-13
Payer: MEDICARE

## 2018-09-13 PROCEDURE — 29581 APPL MULTLAYER CMPRN SYS LEG: CPT

## 2018-09-20 ENCOUNTER — HOSPITAL ENCOUNTER (OUTPATIENT)
Dept: WOUND CARE | Age: 83
Discharge: HOME | End: 2018-09-20
Payer: MEDICARE

## 2018-09-20 VITALS
RESPIRATION RATE: 18 BRPM | TEMPERATURE: 97.6 F | DIASTOLIC BLOOD PRESSURE: 96 MMHG | HEART RATE: 71 BPM | OXYGEN SATURATION: 98 % | SYSTOLIC BLOOD PRESSURE: 142 MMHG

## 2018-09-20 PROCEDURE — 29581 APPL MULTLAYER CMPRN SYS LEG: CPT

## 2018-09-20 NOTE — WOUND CARE
09/20/18 1241 Wound Right Date First Assessed/Time First Assessed: 08/30/18 1523   POA: Yes  Wound Type: Other  Orientation: Right DRESSING STATUS Clean, dry, and intact DRESSING TYPE Compression Wrap/Venous Stasis 
(profore) Wound Length (cm) (no open areas, wrapped for edema) Condition of Base Epithelializing Epithelialization (%) 100 Drainage Amount  None Wound Odor None Periwound Skin Condition Edematous Cleansing and Cleansing Agents  Other (comment) (irais wipes) Dressing Type Applied Compression Wrap/Venous Stasis 
(profore) Procedure Tolerated Well Wound Leg Lower Left Date First Assessed/Time First Assessed: 05/17/18 1512   POA: Yes  Wound Type: Venous  Location: Leg Lower  Orientation: Left DRESSING STATUS Old drainage DRESSING TYPE Other (Comment) (Profore, exudry, xeroform) Non-Pressure Injury Partial thickness (epider/derm) Wound Length (cm) (multiple scattered areas, anterior ) Condition of Base Pink Condition of Edges Open Tissue Type Pink;Red Tissue Type Percent Pink 50 Tissue Type Percent Red 50 Drainage Amount  Moderate Drainage Color Serosanguinous Wound Odor Mild;Musty Periwound Skin Condition Erythema, blanchable Cleansing and Cleansing Agents  Dermal wound cleanser 
(irais wipes/foam to leg) Dressing Type Applied Xeroform 
(profore, exudry) Procedure Tolerated Well

## 2018-09-28 ENCOUNTER — HOSPITAL ENCOUNTER (OUTPATIENT)
Dept: WOUND CARE | Age: 83
Discharge: HOME | End: 2018-09-28
Payer: MEDICARE

## 2018-09-28 VITALS
RESPIRATION RATE: 19 BRPM | HEART RATE: 75 BPM | DIASTOLIC BLOOD PRESSURE: 65 MMHG | OXYGEN SATURATION: 98 % | SYSTOLIC BLOOD PRESSURE: 113 MMHG | TEMPERATURE: 97.6 F

## 2018-09-28 PROCEDURE — 29581 APPL MULTLAYER CMPRN SYS LEG: CPT

## 2018-10-02 NOTE — WOUND CARE
09/28/18 1619 Wound Right Date First Assessed/Time First Assessed: 08/30/18 1523   POA: Yes  Wound Type: Other  Orientation: Right DRESSING STATUS Clean, dry, and intact DRESSING TYPE Compression Wrap/Venous Stasis Wound Length (cm) 0.5 cm Wound Width (cm) 0.5 cm Wound Depth (cm) 0.1 Wound Surface area (cm^2) 0.25 cm^2 Condition of Base Epithelializing Tissue Type Percent Pink 100 Drainage Amount  Scant Wound Odor None Periwound Skin Condition Edematous Cleansing and Cleansing Agents  Dermal wound cleanser Dressing Type Applied Compression Wrap/Venous Stasis Procedure Tolerated Well Wound Leg Lower Left Date First Assessed/Time First Assessed: 05/17/18 1512   POA: Yes  Wound Type: Venous  Location: Leg Lower  Orientation: Left DRESSING STATUS Breakthrough drainage DRESSING TYPE Absorptive; Compression dressing Non-Pressure Injury Partial thickness (epider/derm) Wound Length (cm) (multiple areas circumferential) Condition of Base Pink Condition of Edges Open Tissue Type Pink Tissue Type Percent Pink 50 Tissue Type Percent Red 50 Drainage Amount  Moderate Drainage Color Serosanguinous Wound Odor Mild;Musty Periwound Skin Condition Erythema, blanchable Cleansing and Cleansing Agents  Dermal wound cleanser Dressing Type Applied Xeroform 
(profore) Procedure Tolerated Well

## 2018-10-04 ENCOUNTER — HOSPITAL ENCOUNTER (OUTPATIENT)
Dept: WOUND CARE | Age: 83
Discharge: HOME | End: 2018-10-04
Payer: MEDICARE

## 2018-10-04 VITALS
RESPIRATION RATE: 18 BRPM | DIASTOLIC BLOOD PRESSURE: 74 MMHG | HEART RATE: 88 BPM | TEMPERATURE: 98.6 F | OXYGEN SATURATION: 97 % | SYSTOLIC BLOOD PRESSURE: 156 MMHG

## 2018-10-04 PROCEDURE — 29581 APPL MULTLAYER CMPRN SYS LEG: CPT

## 2018-10-11 ENCOUNTER — HOSPITAL ENCOUNTER (OUTPATIENT)
Dept: WOUND CARE | Age: 83
Discharge: HOME | End: 2018-10-11
Payer: MEDICARE

## 2018-10-11 PROCEDURE — 29581 APPL MULTLAYER CMPRN SYS LEG: CPT

## 2018-10-12 NOTE — WOUND CARE
10/11/18 1516 Wound Right Date First Assessed/Time First Assessed: 08/30/18 1523   POA: Yes  Wound Type: Other  Orientation: Right DRESSING STATUS Clean, dry, and intact; Old drainage DRESSING TYPE Absorptive; Unna boot Non-Pressure Injury Partial thickness (epider/derm) Wound Length (cm) (Circumferential to leg) Wound Depth (cm) 0.1 Condition of Base Pink Condition of Edges Open Tissue Type Pink;Red Drainage Amount  Moderate Drainage Color Serosanguinous Wound Odor Mild Periwound Skin Condition Edematous; Erythema, blanchable;Macerated Cleansing and Cleansing Agents  Other (comment); Soap and water (vashe) Dressing Type Applied Other (Comment) 
(Xeroform, mextra, joan, unna boot, coban) Procedure Tolerated Well

## 2018-10-18 ENCOUNTER — HOSPITAL ENCOUNTER (OUTPATIENT)
Dept: WOUND CARE | Age: 83
Discharge: HOME | End: 2018-10-18
Payer: MEDICARE

## 2018-10-18 VITALS
HEART RATE: 88 BPM | RESPIRATION RATE: 18 BRPM | SYSTOLIC BLOOD PRESSURE: 151 MMHG | DIASTOLIC BLOOD PRESSURE: 67 MMHG | OXYGEN SATURATION: 98 % | TEMPERATURE: 98.1 F

## 2018-10-18 PROCEDURE — 29581 APPL MULTLAYER CMPRN SYS LEG: CPT

## 2018-10-18 NOTE — WOUND CARE
10/18/18 1633 Wound Left Date First Assessed/Time First Assessed: 08/30/18 1523   POA: Yes  Wound Type: Other  Orientation: Left DRESSING STATUS Clean, dry, and intact DRESSING TYPE Absorptive; Unna boot Non-Pressure Injury Partial thickness (epider/derm) Wound Length (cm) (circumferential) Wound Depth (cm) 0.1 Condition of Base Pink Condition of Edges Open Tissue Type Pink;Red Drainage Amount  Moderate Drainage Color Serosanguinous Wound Odor Mild Periwound Skin Condition Erythema, blanchable Cleansing and Cleansing Agents  Dermal wound cleanser (vashe) Dressing Type Applied (alginate,4x4,exudry,joan,profore) Procedure Tolerated Well

## 2018-10-22 ENCOUNTER — HOSPITAL ENCOUNTER (OUTPATIENT)
Dept: WOUND CARE | Age: 83
Discharge: HOME | End: 2018-10-22
Payer: MEDICARE

## 2018-10-22 PROCEDURE — 29581 APPL MULTLAYER CMPRN SYS LEG: CPT

## 2018-10-23 NOTE — WOUND CARE
10/22/18 1515 Wound Left Date First Assessed/Time First Assessed: 08/30/18 1523   POA: Yes  Wound Type: Other  Orientation: Left DRESSING STATUS Clean, dry, and intact DRESSING TYPE Absorptive; Unna boot Non-Pressure Injury Partial thickness (epider/derm) Wound Length (cm) (circumferential) Wound Depth (cm) 0.1 Condition of Base Pink Condition of Edges Closed Tissue Type Pink;Red Drainage Amount  Scant Drainage Color Serosanguinous Wound Odor None Periwound Skin Condition Erythema, blanchable Cleansing and Cleansing Agents  Dermal wound cleanser (vashe) Dressing Type Applied (alginate,wcl,mepilex foam to ankle,4x4,joan,profore) Procedure Tolerated Well

## 2018-10-25 ENCOUNTER — APPOINTMENT (OUTPATIENT)
Dept: WOUND CARE | Age: 83
End: 2018-10-25
Payer: MEDICARE

## 2018-10-30 ENCOUNTER — APPOINTMENT (OUTPATIENT)
Dept: WOUND CARE | Age: 83
End: 2018-10-30
Payer: MEDICARE

## 2018-10-31 ENCOUNTER — HOSPITAL ENCOUNTER (OUTPATIENT)
Dept: WOUND CARE | Age: 83
Discharge: HOME | End: 2018-10-31
Payer: MEDICARE

## 2018-10-31 VITALS
DIASTOLIC BLOOD PRESSURE: 57 MMHG | TEMPERATURE: 97.9 F | HEART RATE: 72 BPM | OXYGEN SATURATION: 94 % | RESPIRATION RATE: 20 BRPM | SYSTOLIC BLOOD PRESSURE: 135 MMHG

## 2018-10-31 PROCEDURE — 29581 APPL MULTLAYER CMPRN SYS LEG: CPT

## 2018-10-31 NOTE — WOUND CARE
10/31/18 1442 Wound Left Date First Assessed/Time First Assessed: 08/30/18 1523   POA: Yes  Wound Type: Other  Orientation: Left DRESSING STATUS Old drainage DRESSING TYPE Absorptive; Alginate; Compression dressing Non-Pressure Injury Full thickness (subcut/muscle) Wound Length (cm) 4.4 cm 
(posterior lower leg) Wound Width (cm) 5 cm Wound Depth (cm) 0.1 Wound Surface area (cm^2) 22 cm^2 Condition of Base Pink Condition of Edges Open;Calloused Tissue Type Pink;Red Drainage Amount  Moderate Drainage Color Serosanguinous; Tan  
Wound Odor None Periwound Skin Condition Calloused; Other (comment) (Dry, flakey skin) Cleansing and Cleansing Agents  Other (comment); Soap and water (vashe) Dressing Type Applied Other (Comment) 
(Calcium alginate, WCL, Mepilex lite, joan, Profore) Procedure Tolerated Well

## 2018-11-08 ENCOUNTER — HOSPITAL ENCOUNTER (OUTPATIENT)
Dept: WOUND CARE | Age: 83
Discharge: HOME | End: 2018-11-08
Payer: MEDICARE

## 2018-11-08 VITALS
OXYGEN SATURATION: 96 % | HEART RATE: 67 BPM | SYSTOLIC BLOOD PRESSURE: 138 MMHG | TEMPERATURE: 97.5 F | RESPIRATION RATE: 19 BRPM | DIASTOLIC BLOOD PRESSURE: 51 MMHG

## 2018-11-08 PROCEDURE — 29581 APPL MULTLAYER CMPRN SYS LEG: CPT

## 2018-11-08 NOTE — WOUND CARE
11/08/18 1311 Wound Left Date First Assessed/Time First Assessed: 08/30/18 1523   POA: Yes  Wound Type: Other  Orientation: Left DRESSING STATUS Breakthrough drainage DRESSING TYPE Absorptive; Compression dressing Non-Pressure Injury Full thickness (subcut/muscle) Wound Length (cm) (multiple open areas circumferential to leg) Condition of Base Pink Condition of Edges Open Tissue Type Red;Pink Drainage Amount  Moderate Drainage Color Serosanguinous Wound Odor Mild Periwound Skin Condition Calloused;Erythema, blanchable Cleansing and Cleansing Agents  Other (comment); Soap and water (vashe) Dressing Type Applied Other (Comment) 
(Calcium alginate, mextra, joan, profore) Procedure Tolerated Well

## 2018-11-14 ENCOUNTER — HOSPITAL ENCOUNTER (OUTPATIENT)
Dept: WOUND CARE | Age: 83
Discharge: HOME | End: 2018-11-14
Payer: MEDICARE

## 2018-11-14 VITALS
RESPIRATION RATE: 18 BRPM | DIASTOLIC BLOOD PRESSURE: 67 MMHG | OXYGEN SATURATION: 99 % | SYSTOLIC BLOOD PRESSURE: 142 MMHG | TEMPERATURE: 98.6 F | HEART RATE: 80 BPM

## 2018-11-14 PROCEDURE — 29581 APPL MULTLAYER CMPRN SYS LEG: CPT

## 2018-11-29 ENCOUNTER — HOSPITAL ENCOUNTER (OUTPATIENT)
Dept: WOUND CARE | Age: 83
Discharge: HOME | End: 2018-11-29
Payer: MEDICARE

## 2018-11-29 PROCEDURE — 29581 APPL MULTLAYER CMPRN SYS LEG: CPT

## 2018-11-29 NOTE — WOUND CARE
11/29/18 1417 Wound Left Date First Assessed/Time First Assessed: 08/30/18 1523   POA: Yes  Wound Type: Other  Orientation: Left DRESSING STATUS Clean, dry, and intact DRESSING TYPE Absorptive; Compression boot Non-Pressure Injury Partial thickness (epider/derm) Condition of Base Epithelializing;Pink Condition of Edges Closed Tissue Type Pink;Red Drainage Amount  Small Drainage Color Serosanguinous Wound Odor Mild Periwound Skin Condition Erythema, blanchable 
(dry,flaking) Cleansing and Cleansing Agents  Dermal wound cleanser (vashe) Dressing Type Applied (calcium alginate,mextra,joan,profore) Procedure Tolerated Well

## 2018-11-30 NOTE — WOUND CARE
11/14/18 1359 Wound Left Date First Assessed/Time First Assessed: 08/30/18 1523   POA: Yes  Wound Type: Other  Orientation: Left DRESSING STATUS Clean, dry, and intact DRESSING TYPE Absorptive; Compression boot Non-Pressure Injury Full thickness (subcut/muscle) Wound Length (cm) (multiple open areas to entire lower left leg) Condition of Base Pink;Epithelializing Condition of Edges Closed Tissue Type Pink;Red Drainage Amount  Moderate Drainage Color Serosanguinous Wound Odor Strong Periwound Skin Condition Erythema, blanchable;Macerated Cleansing and Cleansing Agents  Dermal wound cleanser (vashe) Dressing Type Applied (calcium alginate,mextra,joan,profore) Procedure Tolerated Well

## 2018-12-06 ENCOUNTER — HOSPITAL ENCOUNTER (OUTPATIENT)
Dept: WOUND CARE | Age: 83
Discharge: HOME | End: 2018-12-06
Payer: MEDICARE

## 2018-12-06 PROCEDURE — 29581 APPL MULTLAYER CMPRN SYS LEG: CPT

## 2018-12-10 NOTE — WOUND CARE
12/06/18 1518 Wound Left Date First Assessed/Time First Assessed: 08/30/18 1523   POA: Yes  Wound Type: Other  Orientation: Left DRESSING STATUS Clean, dry, and intact DRESSING TYPE Absorptive; Unna boot Non-Pressure Injury Partial thickness (epider/derm) Condition of Base Epithelializing;Pink Condition of Edges Closed Tissue Type Pink;Red Drainage Amount  Small Drainage Color Serosanguinous Wound Odor Mild Periwound Skin Condition Erythema, blanchable Cleansing and Cleansing Agents  Dermal wound cleanser (vashe) Dressing Type Applied (calcium alginate,mextra,joan,profore) Procedure Tolerated Well

## 2018-12-13 ENCOUNTER — HOSPITAL ENCOUNTER (OUTPATIENT)
Dept: WOUND CARE | Age: 83
Discharge: HOME | End: 2018-12-13
Payer: MEDICARE

## 2018-12-13 VITALS
TEMPERATURE: 97.5 F | OXYGEN SATURATION: 100 % | HEART RATE: 53 BPM | DIASTOLIC BLOOD PRESSURE: 48 MMHG | SYSTOLIC BLOOD PRESSURE: 130 MMHG | RESPIRATION RATE: 17 BRPM

## 2018-12-13 PROCEDURE — 29581 APPL MULTLAYER CMPRN SYS LEG: CPT

## 2018-12-13 NOTE — WOUND CARE
12/13/18 1420   Wound Left   Date First Assessed/Time First Assessed: 08/30/18 1523   POA: Yes  Wound Type: Other  Orientation: Left   DRESSING STATUS Clean, dry, and intact   DRESSING TYPE Absorptive; Unna boot   Non-Pressure Injury Partial thickness (epider/derm)   Condition of Base Epithelializing;Pink   Condition of Edges Closed   Tissue Type Pink;Red   Drainage Amount  Small    Drainage Color Serosanguinous   Wound Odor Mild   Periwound Skin Condition Erythema, blanchable   Cleansing and Cleansing Agents  Dermal wound cleanser  (vashe)   Dressing Type Applied (multilayer comp wrap,alginate,mextra,joan)   Procedure Tolerated Well

## 2018-12-20 ENCOUNTER — HOSPITAL ENCOUNTER (OUTPATIENT)
Dept: WOUND CARE | Age: 83
Discharge: HOME | End: 2018-12-20
Payer: MEDICARE

## 2018-12-20 VITALS
DIASTOLIC BLOOD PRESSURE: 71 MMHG | SYSTOLIC BLOOD PRESSURE: 116 MMHG | OXYGEN SATURATION: 96 % | RESPIRATION RATE: 18 BRPM | TEMPERATURE: 97.7 F | HEART RATE: 74 BPM

## 2018-12-20 PROCEDURE — 29581 APPL MULTLAYER CMPRN SYS LEG: CPT

## 2018-12-20 NOTE — WOUND CARE
12/20/18 1554   Wound Left   Date First Assessed/Time First Assessed: 08/30/18 1523   POA: Yes  Wound Type: Other  Orientation: Left   DRESSING STATUS Clean, dry, and intact   DRESSING TYPE Absorptive; Compression dressing   Non-Pressure Injury Full thickness (subcut/muscle)   Wound Length (cm) 2.5 cm   Wound Width (cm) 3 cm   Wound Depth (cm) 0.1   Wound Surface area (cm^2) 7.5 cm^2   Condition of Base Pink   Condition of Edges Open   Tissue Type Pink;Red   Drainage Amount  Moderate   Drainage Color Serosanguinous   Wound Odor None   Periwound Skin Condition Other (comment)  (Dry, flaky skin)   Cleansing and Cleansing Agents  Other (comment); Soap and water  (vashe)   Dressing Type Applied Other (Comment)  (Alginate, Mextra, joan, profore)   Procedure Tolerated Well

## 2018-12-27 ENCOUNTER — HOSPITAL ENCOUNTER (OUTPATIENT)
Dept: WOUND CARE | Age: 83
Discharge: HOME | End: 2018-12-27
Payer: MEDICARE

## 2018-12-27 VITALS
RESPIRATION RATE: 20 BRPM | HEART RATE: 62 BPM | OXYGEN SATURATION: 98 % | DIASTOLIC BLOOD PRESSURE: 57 MMHG | SYSTOLIC BLOOD PRESSURE: 110 MMHG | TEMPERATURE: 97.4 F

## 2018-12-27 PROCEDURE — 29581 APPL MULTLAYER CMPRN SYS LEG: CPT

## 2018-12-27 NOTE — WOUND CARE
12/27/18 1356   Wound Left   Date First Assessed/Time First Assessed: 08/30/18 1523   POA: Yes  Wound Type: Other  Orientation: Left   DRESSING STATUS Clean, dry, and intact   DRESSING TYPE Absorptive; Compression dressing   Non-Pressure Injury Full thickness (subcut/muscle)   Wound Length (cm) 0.3 cm   Wound Width (cm) 1 cm   Wound Depth (cm) 0.1   Wound Surface area (cm^2) 0.3 cm^2   Condition of Base Granulation;Pink   Condition of Edges Open   Tissue Type Pink;Red   Drainage Amount  Small    Drainage Color Serosanguinous; Tan   Wound Odor None   Periwound Skin Condition Intact; Erythema, blanchable;Edematous   Cleansing and Cleansing Agents  Other (comment); Soap and water  (vashe)   Dressing Type Applied Other (Comment)  (Alginate, mepilex lite, profore)   Procedure Tolerated Well

## 2019-01-04 ENCOUNTER — HOSPITAL ENCOUNTER (OUTPATIENT)
Dept: WOUND CARE | Age: 84
Discharge: HOME | End: 2019-01-04
Payer: MEDICARE

## 2019-01-04 VITALS
TEMPERATURE: 97.9 F | OXYGEN SATURATION: 98 % | DIASTOLIC BLOOD PRESSURE: 56 MMHG | RESPIRATION RATE: 19 BRPM | SYSTOLIC BLOOD PRESSURE: 134 MMHG | HEART RATE: 74 BPM

## 2019-01-04 PROCEDURE — 29581 APPL MULTLAYER CMPRN SYS LEG: CPT

## 2019-01-04 NOTE — WOUND CARE
01/04/19 1335 Wound Left Date First Assessed/Time First Assessed: 08/30/18 1523   POA: Yes  Wound Type: Other  Orientation: Left DRESSING STATUS Clean, dry, and intact DRESSING TYPE Absorptive; Compression dressing Non-Pressure Injury Full thickness (subcut/muscle) Wound Length (cm) (Multiple open areas circumferential to lower left leg) Wound Depth (cm) 0.1 Condition of Base Pink Condition of Edges Open Tissue Type Pink;Red Drainage Amount  Moderate Drainage Color Serosanguinous Wound Odor None Periwound Skin Condition Intact; Erythema, blanchable;Edematous; Other (comment) (Dry, scaly skin) Cleansing and Cleansing Agents  Soap and water; Other (comment) (vashe) Dressing Type Applied Other (Comment) (Temovate, alginate, mepilex lite, profore) Procedure Tolerated Well

## 2019-01-04 NOTE — DISCHARGE INSTRUCTIONS
In wound care clinic today:  Cleanse wound with NS or soap and water or commercial wound cleanser  Apply the following topically to wound bed: Temovate, alginate  Apply the following dressings: Dry absorbent dressing, profore    For Home Care/Self Care  Keep dressing dry and intact when bathing    Leave dressings in place until next visit    Patient to return for wound care in: [address] [place]. Inspect your wounds, looking for signs of infection which may include the following:  Increase in redness  Red \"streaks\" from wound  Increase in swelling  Fever  Unusual odor  Change in the amount of wound drainage. Should you experience any significant changes in your wound(s) or have any questions regarding your home care instructions please contact the wound center or your home health company. If after regular business hours, please call your family doctor or local emergency room. Edema Control:   Elevate legs as much as possible. Avoid standing in one position for more than 10 minutes. Avoid setting with legs down. Do not cross legs while sitting. Off-Loading:     Frequent position changes. Do not cross legs while sitting. Shift weight every 20 minutes or more when sitting for prolonged periods of time.

## 2019-01-10 ENCOUNTER — HOSPITAL ENCOUNTER (OUTPATIENT)
Dept: WOUND CARE | Age: 84
Discharge: HOME | End: 2019-01-10
Payer: MEDICARE

## 2019-01-10 PROCEDURE — 29581 APPL MULTLAYER CMPRN SYS LEG: CPT

## 2019-01-14 NOTE — WOUND CARE
01/14/19 1519 Wound Left Date First Assessed/Time First Assessed: 08/30/18 1523   POA: Yes  Wound Type: Other  Orientation: Left DRESSING STATUS Clean, dry, and intact

## 2019-01-17 ENCOUNTER — HOSPITAL ENCOUNTER (OUTPATIENT)
Dept: WOUND CARE | Age: 84
Discharge: HOME | End: 2019-01-17
Payer: MEDICARE

## 2019-01-17 VITALS
TEMPERATURE: 97.8 F | OXYGEN SATURATION: 100 % | DIASTOLIC BLOOD PRESSURE: 54 MMHG | HEART RATE: 75 BPM | SYSTOLIC BLOOD PRESSURE: 119 MMHG | RESPIRATION RATE: 18 BRPM

## 2019-01-17 PROCEDURE — 29581 APPL MULTLAYER CMPRN SYS LEG: CPT

## 2019-01-17 NOTE — DISCHARGE INSTRUCTIONS
For Home Care/Self Care  Keep dressing dry and intact when bathing    Leave dressings in place until next visit    Patient to return for wound care in: [address] [place]. Inspect your wounds, looking for signs of infection which may include the following:  Increase in redness  Red \"streaks\" from wound  Increase in swelling  Fever  Unusual odor  Change in the amount of wound drainage. Should you experience any significant changes in your wound(s) or have any questions regarding your home care instructions please contact the wound center or your home health company. If after regular business hours, please call your family doctor or local emergency room. Edema Control:   Elevate legs as much as possible. Avoid standing in one position for more than 10 minutes. Avoid setting with legs down. Do not cross legs while sitting. Off-Loading:     Frequent position changes. Do not cross legs while sitting. Shift weight every 20 minutes or more when sitting for prolonged periods of time.

## 2019-01-17 NOTE — WOUND CARE
01/17/19 1335 Wound Left Date First Assessed/Time First Assessed: 08/30/18 1523   POA: Yes  Wound Type: Other  Orientation: Left DRESSING STATUS Clean, dry, and intact DRESSING TYPE Compression Wrap/Venous Stasis 
(joan, alginate, mextra) Wound Length (cm) (clinically closed ) Drainage Amount  None Wound Odor None Periwound Skin Condition Intact Cleansing and Cleansing Agents  Other (comment) (foam/wipes) Dressing Type Applied Other (Comment) (profore, joan, calcium alginate ) Procedure Tolerated Well

## 2019-01-24 ENCOUNTER — HOSPITAL ENCOUNTER (OUTPATIENT)
Dept: WOUND CARE | Age: 84
Discharge: HOME | End: 2019-01-24
Payer: MEDICARE

## 2019-01-24 VITALS
OXYGEN SATURATION: 99 % | RESPIRATION RATE: 18 BRPM | SYSTOLIC BLOOD PRESSURE: 156 MMHG | TEMPERATURE: 99 F | DIASTOLIC BLOOD PRESSURE: 77 MMHG | HEART RATE: 82 BPM

## 2019-01-24 PROCEDURE — 29581 APPL MULTLAYER CMPRN SYS LEG: CPT

## 2019-01-25 NOTE — WOUND CARE
01/24/19 1456 Wound Left Date First Assessed/Time First Assessed: 08/30/18 1523   POA: Yes  Wound Type: Other  Orientation: Left DRESSING STATUS Clean, dry, and intact DRESSING TYPE Compression Wrap/Venous Stasis 
(joan,alginate) Wound Length (cm) (clinically closed) Condition of Base Pink Drainage Amount  None Wound Odor None Periwound Skin Condition Intact 
(dry,scaley) Cleansing and Cleansing Agents  (foam wash,vashe) Dressing Type Applied (profore,joan,alginate) Procedure Tolerated Well

## 2019-01-30 ENCOUNTER — APPOINTMENT (OUTPATIENT)
Dept: WOUND CARE | Age: 84
End: 2019-01-30
Payer: MEDICARE

## 2019-01-31 ENCOUNTER — HOSPITAL ENCOUNTER (OUTPATIENT)
Dept: WOUND CARE | Age: 84
Discharge: HOME | End: 2019-01-31
Payer: MEDICARE

## 2019-01-31 VITALS
RESPIRATION RATE: 18 BRPM | SYSTOLIC BLOOD PRESSURE: 155 MMHG | HEART RATE: 80 BPM | DIASTOLIC BLOOD PRESSURE: 73 MMHG | TEMPERATURE: 98.8 F | OXYGEN SATURATION: 99 %

## 2019-01-31 PROCEDURE — 99211 OFF/OP EST MAY X REQ PHY/QHP: CPT

## 2019-02-07 NOTE — PROGRESS NOTES
[address] Subjective: Chief Complaint: Kassandra Antoine is a 80 y.o. male who returns to [facility] today, for follow up treatment of multiple open wounds to the left lower leg and both right and left 2nd toe wounds. Today he is complaining of increased redness and swelling in his left leg with weeping. Past Medical History:  
Diagnosis Date  Arthritis  Cancer ([address] skin cancer, squamous cell  H/O seasonal allergies  Hypercholesteremia Past Surgical History:  
Procedure Laterality Date  HX CATARACT REMOVAL Bilateral   
 HX HERNIA REPAIR Bilateral 2007  HX TONSILLECTOMY Current Medications: 
Prior to Admission medications Medication Sig Start Date End Date Taking? Authorizing Provider  
acetaminophen (TYLENOL) 325 mg tablet Take 2 Tabs by mouth every four (4) hours as needed for Pain. 9/22/17   Modesta Duckworth MD  
peg 400-propylene glycol (SYSTANE) 0.4-0.3 % drop Administer 1 Drop to both eyes as needed. Provider, Historical  
oxaprozin (DAYPRO) 600 mg tablet Take 600 mg by mouth two (2) times a day. Indications: OSTEOARTHRITIS    Provider, Historical  
cholecalciferol, vitamin D3, 2,000 unit tab Take  by mouth daily. Indications: PREVENTION OF VITAMIN D DEFICIENCY    Provider, Historical  
diphenhydrAMINE (BENADRYL) 25 mg tablet Take 25 mg by mouth nightly. Provider, Historical  
terazosin (HYTRIN) 5 mg capsule Take 5 mg by mouth nightly. Indications: SYMPTOMATIC BENIGN PROSTATIC HYPERPLASIA    Provider, Historical  
aMILoride-hydrochlorothiazide (MODURETIC) 5-50 mg tab Take 1 Tab by mouth daily. Indications: EDEMA    Provider, Historical  
simvastatin (ZOCOR) 20 mg tablet Take 20 mg by mouth nightly.  Indications: HYPERCHOLESTEROLEMIA    Provider, Historical  
calcium-cholecalciferol, D3, (CALTRATE 600+D) tablet Take 1 Tab by mouth daily. Indications: HYPOCALCEMIA PREVENTION    Provider, Historical  
 
Allergies Allergen Reactions  Cephalexin Other (comments) Weakness and syncope History reviewed. No pertinent family history. Social History Socioeconomic History  Marital status:  Spouse name: Not on file  Number of children: Not on file  Years of education: Not on file  Highest education level: Not on file Social Needs  Financial resource strain: Not on file  Food insecurity - worry: Not on file  Food insecurity - inability: Not on file  Transportation needs - medical: Not on file  Transportation needs - non-medical: Not on file Occupational History  Not on file Tobacco Use  Smoking status: Former Smoker Last attempt to quit: 7/15/2005 Years since quittin.5  Smokeless tobacco: Never Used Substance and Sexual Activity  Alcohol use: No  
 Drug use: No  
 Sexual activity: Not Currently Other Topics Concern  Not on file Social History Narrative  Not on file Review of Systems: 
 
 
Derm:  Left and right 2nd toe wounds, redness and edema of left leg with posterior weeping Musc/skeletal: no bone or joint complains. Vasc: No edema, cyanosis or claudication. Objective:  
 
Physical Assessment: 
 
Vitals:  
 18 1401 BP: 113/65 Pulse: 75 Resp: 19 Temp: 97.6 °F (36.4 °C) SpO2: 98% Lower Extremity Exam: 
 
Vascular Exam: 
Dorsalis Pedis Pulse: 2/4  Bilateral Lower Extremities Posterior Tibial Pulse: 2/4 Bilateral Lower Extremities Capillary Refill is < 3 seconds Bilateral Lower Extremities Temperature of extremity from proximal to distal is warm and perfused Bilateral Lower Extremities Integumentary Exam: 
Skin Texture is WNL Bilateral Lower Extremities Pedal Hair is present Bilateral Lower Extremities Examination of lower extremity reveals - 
Etiology: Started as a blister - Wound Description: Wound Type: Venous stasis   Diabetic  Post surgical  Trauma Indication: Acute <30days   Chronic >30days Status: initial  improving  no change  deteriorating Measurements: 
  Wound Length: 0.5 cm, (multiple areas circumferential) Wound Width :0.5 cm Wound Depth :0.1 Tissue Type: Ulcer bed: Red granular Periwound: Surrounding erythema with intact dermal layer Exudate: Slight serous drainage noted Epithelial: with out necrosis Granulation: with out necrosis Subcutaneous: with out necrosis Slough: no 
Eschar: no 
Tendon: exposed no -with out necrosis Fascia: exposed no -with out necrosis Muscle: exposed no -with out necrosis Bone: exposed no -with out necrosis Drainage: Increased Debridement: -not required Infection: no 
Edema: yes   Both lower extremities Status:  increased Patient was educated on the importance of edema control today Compression: yes Status:  Start Type: [facility] Patient Compliant:  yes Offloading:  No  
 
Nicotine/Tobacco Use:  No 
 
Orthopedic Exam: Musculoskeletal Exam: Muscle strength is 5/5 Bilateral Lower Extremities. Tendon, Muscle and Bone and joints and WNL Bilateral Lower Extremities. Range of motion and strength noted in both ankles is WNL. Neurological Exam: Ankle deep tendon reflexes: Are WNL Bilateral Lower Extremities Epicritic and Protective Sensation: Are Intact Bilateral Lower Extremities Evaluation of lower extremity nerves: Are Intact as seen with 5.07 mm monofilament at 5 points per foot. Laboratory Results: 
@ Basic Metabolic Profile@ Lab Results Component Value Date  09/21/2017 CO2 29 09/21/2017 BUN 18 09/21/2017 Data Review: No results found for this or any previous visit (from the past 24 hour(s)). Assessment:  
 
80 y.o. male with There is no problem list on file for this patient.  
 
 
Open ulcers left lower extremity, right and left 2nd toe wounds, bilateral increasing edema with weeping. Recommendation:  
 
Plan:  
 
Plan/Procedure: 
 
Dispense prescription for Levaquin Dispense prescription for Vascular consult with Dr. Eloy James. Needs : 
Good local wound care Edema management In wound care clinic today: 
Cleanse wound with commercial wound cleanser Apply the following topically to wound bed:  
 
Apply the following to charly-wound: NA Apply the following dressings: UNNA boot bilateral 
 
 
 
Patient to return for wound care in: 4  Days Follow up with Nurse visit as recommended. PLEASE CONTACT OFFICE AS SOON AS POSSIBLE IF UNABLE TO MAKE THIS APPOINTMENT. Inspect your wounds, looking for signs of infection which may include the following:  Increase in redness  Red \"streaks\" from wound  Increase in swelling Fever  Unusual odor Change in the amount of wound drainage. Should you experience any significant changes in your wound(s) or have any questions regarding your home care instructions please contact the wound center or your home health company. If after regular business hours, please call your family doctor or local emergency room. Edema Control:   Elevate legs as much as possible. Avoid standing in one position for more than 10 minutes. Avoid setting with legs down. Do not cross legs while sitting. Off-Loading:     Frequent position changes. Do not cross legs while sitting. Shift weight every 20 minutes or more when sitting for prolonged periods of time.  
 
 
Signed By: Elizabeth Medrano DPM 
  
 February 7, 2019, 4:39 PM

## 2019-02-08 ENCOUNTER — HOSPITAL ENCOUNTER (OUTPATIENT)
Dept: WOUND CARE | Age: 84
Discharge: HOME | End: 2019-02-08
Attending: PODIATRIST
Payer: MEDICARE

## 2019-02-08 VITALS
DIASTOLIC BLOOD PRESSURE: 54 MMHG | TEMPERATURE: 97.7 F | OXYGEN SATURATION: 98 % | RESPIRATION RATE: 16 BRPM | HEART RATE: 69 BPM | SYSTOLIC BLOOD PRESSURE: 142 MMHG

## 2019-02-08 PROCEDURE — 99214 OFFICE O/P EST MOD 30 MIN: CPT

## 2019-02-11 NOTE — WOUND CARE
02/08/19 1531 Wound Left Date First Assessed/Time First Assessed: 08/30/18 1523   POA: Yes  Wound Type: Other  Orientation: Left Dressing Status  Clean, dry, and intact Dressing Type  Compression Wrap/Venous Stasis Wound Length (cm) (clinically closed) Condition of [place] Wound Odor None Periwound Skin Condition Intact Cleansing and Cleansing Agents  (foam wash,vashe) Dressing Type Applied (farrow wrap 4000) Procedure Tolerated Well Wound Leg Lower Right Date First Assessed/Time First Assessed: 03/09/18 1133   POA: Yes  Wound Type: Venous  Location: Leg Lower  Orientation: Right Dressing Type  (farrow wrap)

## 2019-03-26 NOTE — PROGRESS NOTES
[address] [facility] [address] [address] Subjective: Chief Complaint: Sander Rubin is a 80 y.o. male who returns to [facility] today, for continued treatment on both of his legs swelling, the left greater than the right, with open wound on the back of his left leg. Past Medical History:  
Diagnosis Date  Arthritis  Cancer ([facility] [address] skin cancer, squamous cell  H/O seasonal allergies  Hypercholesteremia Past Surgical History:  
Procedure Laterality Date  HX CATARACT REMOVAL Bilateral   
 HX HERNIA REPAIR Bilateral 2007  HX TONSILLECTOMY Current Medications: 
Prior to Admission medications Medication Sig Start Date End Date Taking? Authorizing Provider  
acetaminophen (TYLENOL) 325 mg tablet Take 2 Tabs by mouth every four (4) hours as needed for Pain. 9/22/17   Yocasta Wright MD  
peg 400-propylene glycol (SYSTANE) 0.4-0.3 % drop Administer 1 Drop to both eyes as needed. Provider, Historical  
oxaprozin (DAYPRO) 600 mg tablet Take 600 mg by mouth two (2) times a day. Indications: OSTEOARTHRITIS    Provider, Historical  
cholecalciferol, vitamin D3, 2,000 unit tab Take  by mouth daily. Indications: PREVENTION OF VITAMIN D DEFICIENCY    Provider, Historical  
diphenhydrAMINE (BENADRYL) 25 mg tablet Take 25 mg by mouth nightly. Provider, Historical  
terazosin (HYTRIN) 5 mg capsule Take 5 mg by mouth nightly. Indications: SYMPTOMATIC BENIGN PROSTATIC HYPERPLASIA    Provider, Historical  
aMILoride-hydrochlorothiazide (MODURETIC) 5-50 mg tab Take 1 Tab by mouth daily. Indications: EDEMA    Provider, Historical  
simvastatin (ZOCOR) 20 mg tablet Take 20 mg by mouth nightly. Indications: HYPERCHOLESTEROLEMIA    Provider, Historical  
calcium-cholecalciferol, D3, (CALTRATE 600+D) tablet Take 1 Tab by mouth daily. Indications: HYPOCALCEMIA PREVENTION    Provider, Historical  
 
Allergies Allergen Reactions  Cephalexin Other (comments) Weakness and syncope History reviewed. No pertinent family history. Social History Socioeconomic History  Marital status:  Spouse name: Not on file  Number of children: Not on file  Years of education: Not on file  Highest education level: Not on file Occupational History  Not on file Social Needs  Financial resource strain: Not on file  Food insecurity:  
  Worry: Not on file Inability: Not on file  Transportation needs:  
  Medical: Not on file Non-medical: Not on file Tobacco Use  Smoking status: Former Smoker Last attempt to quit: 7/15/2005 Years since quittin.7  Smokeless tobacco: Never Used Substance and Sexual Activity  Alcohol use: No  
 Drug use: No  
 Sexual activity: Not Currently Lifestyle  Physical activity:  
  Days per week: Not on file Minutes per session: Not on file  Stress: Not on file Relationships  Social connections:  
  Talks on phone: Not on file Gets together: Not on file Attends Restorationism service: Not on file Active member of club or organization: Not on file Attends meetings of clubs or organizations: Not on file Relationship status: Not on file  Intimate partner violence:  
  Fear of current or ex partner: Not on file Emotionally abused: Not on file Physically abused: Not on file Forced sexual activity: Not on file Other Topics Concern  Not on file Social History Narrative  Not on file Review of Systems: 
 
 
Derm: open wound left posterior leg, open wound right anterior shin Musc/skeletal: no bone or joint complains. Vasc: edema bilateral lower extremities L > R 
 
 
 
Objective:  
 
Physical Assessment: 
 
Vitals:  
 18 1401 BP: 113/65 Pulse: 75 Resp: 19 Temp: 97.6 °F (36.4 °C) SpO2: 98% Lower Extremity Exam: 
 
Vascular Exam: Dorsalis Pedis Pulse: 2/4  Bilateral Lower Extremities Posterior Tibial Pulse: 2/4 Bilateral Lower Extremities Capillary Refill is < 3 seconds Bilateral Lower Extremities Temperature of extremity from proximal to distal is warm and perfused Bilateral Lower Extremities Integumentary Exam: 
Skin Texture is WNL Bilateral Lower Extremities Pedal Hair is present Bilateral Lower Extremities Examination of lower extremity reveals - open wound left posterior leg, open wound right anterior shin Etiology: Started as a blister - Wound Description: Wound Type: Other Indication:   Chronic >30days Status:  no change Measurements: 
  Wound Length: 12.0cm, 0.5cm Wound Width : 9.0cm, 0.5cm Wound Depth :0.1cm, 0.1cm Tissue Type: Ulcer bed: large area of red erythema Periwound: Surrounding erythema with intact dermal layer Exudate: Slight serous drainage noted Epithelial: with out necrosis Granulation: with out necrosis Subcutaneous: with out necrosis Slough: no 
Eschar: no 
Tendon: exposed no -with out necrosis Fascia: exposed no -with out necrosis Muscle: exposed no -with out necrosis Bone: exposed no -with out necrosis Drainage: Stable Debridement: -not required Infection: no 
Edema: yes   Both lower extremities Status:  Increased in left lower extremity Patient was educated on the importance of edema control today Compression: yes Status:  continue Type: profore Patient Compliant:  yes Offloading:  No  
 
Nicotine/Tobacco Use:  No 
 
Orthopedic Exam: Musculoskeletal Exam: Muscle strength is 5/5 Bilateral Lower Extremities. Tendon, Muscle and Bone and joints and WNL Bilateral Lower Extremities. Range of motion and strength noted in both ankles is WNL. Neurological Exam: Ankle deep tendon reflexes: Are WNL Bilateral Lower Extremities Epicritic and Protective Sensation: Are Intact Bilateral Lower Extremities Evaluation of lower extremity nerves: Are Intact as seen with 5.07 mm monofilament at 5 points per foot. Laboratory Results: 
@ Basic Metabolic Profile@ Lab Results Component Value Date  09/21/2017 CO2 29 09/21/2017 BUN 18 09/21/2017 Data Review: No results found for this or any previous visit (from the past 24 hour(s)). Assessment:  
 
80 y.o. male with There is no problem list on file for this patient. Open wound left posterior leg, open wound right anterior shin Recommendation:  
 
Plan:  
 
Plan/Procedure: 
 
Dispense prescription for Melvin compressive wraps- bilateral. 
 
Needs : 
Good local wound care Edema management In wound care clinic today: 
Cleanse wound with commercial wound cleanser Apply the following topically to wound bed: alexanedr (R) Apply the following to charly-wound: NA Apply the following dressings: profore (L) Patient to return for wound care in: 7  Days Follow up with Nurse visit as recommended. PLEASE CONTACT OFFICE AS SOON AS POSSIBLE IF UNABLE TO MAKE THIS APPOINTMENT. Inspect your wounds, looking for signs of infection which may include the following:  Increase in redness  Red \"streaks\" from wound  Increase in swelling Fever  Unusual odor Change in the amount of wound drainage. Should you experience any significant changes in your wound(s) or have any questions regarding your home care instructions please contact the wound center or your home health company. If after regular business hours, please call your family doctor or local emergency room. Edema Control:   Elevate legs as much as possible. Avoid standing in one position for more than 10 minutes. Avoid setting with legs down. Do not cross legs while sitting.  
 
 
 
 
Signed By: Chase Shahid DPM 
  
 March 26, 2019, 4:39 PM

## 2019-04-16 NOTE — PROGRESS NOTES
[address] [facility] [address] Subjective: Chief Complaint: Adi Medina is a 80 y.o. male who returns to [facility] today, for continued treatment of ulcer on the back of his left ankle area. Past Medical History:  
Diagnosis Date  Arthritis  Cancer ([address] skin cancer, squamous cell  H/O seasonal allergies  Hypercholesteremia Past Surgical History:  
Procedure Laterality Date  HX CATARACT REMOVAL Bilateral   
 HX HERNIA REPAIR Bilateral 2007  HX TONSILLECTOMY Current Medications: 
Prior to Admission medications Medication Sig Start Date End Date Taking? Authorizing Provider  
acetaminophen (TYLENOL) 325 mg tablet Take 2 Tabs by mouth every four (4) hours as needed for Pain. 9/22/17   Maria E Harris MD  
peg 400-propylene glycol (SYSTANE) 0.4-0.3 % drop Administer 1 Drop to both eyes as needed. Provider, Historical  
oxaprozin (DAYPRO) 600 mg tablet Take 600 mg by mouth two (2) times a day. Indications: OSTEOARTHRITIS    Provider, Historical  
cholecalciferol, vitamin D3, 2,000 unit tab Take  by mouth daily. Indications: PREVENTION OF VITAMIN D DEFICIENCY    Provider, Historical  
diphenhydrAMINE (BENADRYL) 25 mg tablet Take 25 mg by mouth nightly. Provider, Historical  
terazosin (HYTRIN) 5 mg capsule Take 5 mg by mouth nightly. Indications: SYMPTOMATIC BENIGN PROSTATIC HYPERPLASIA    Provider, Historical  
aMILoride-hydrochlorothiazide (MODURETIC) 5-50 mg tab Take 1 Tab by mouth daily. Indications: EDEMA    Provider, Historical  
simvastatin (ZOCOR) 20 mg tablet Take 20 mg by mouth nightly. Indications: HYPERCHOLESTEROLEMIA    Provider, Historical  
calcium-cholecalciferol, D3, (CALTRATE 600+D) tablet Take 1 Tab by mouth daily. Indications: HYPOCALCEMIA PREVENTION    Provider, Historical  
 
Allergies Allergen Reactions  Cephalexin Other (comments) Weakness and syncope History reviewed. No pertinent family history. Social History Socioeconomic History  Marital status:  Spouse name: Not on file  Number of children: Not on file  Years of education: Not on file  Highest education level: Not on file Occupational History  Not on file Social Needs  Financial resource strain: Not on file  Food insecurity:  
  Worry: Not on file Inability: Not on file  Transportation needs:  
  Medical: Not on file Non-medical: Not on file Tobacco Use  Smoking status: Former Smoker Last attempt to quit: 7/15/2005 Years since quittin.7  Smokeless tobacco: Never Used Substance and Sexual Activity  Alcohol use: No  
 Drug use: No  
 Sexual activity: Not Currently Lifestyle  Physical activity:  
  Days per week: Not on file Minutes per session: Not on file  Stress: Not on file Relationships  Social connections:  
  Talks on phone: Not on file Gets together: Not on file Attends Muslim service: Not on file Active member of club or organization: Not on file Attends meetings of clubs or organizations: Not on file Relationship status: Not on file  Intimate partner violence:  
  Fear of current or ex partner: Not on file Emotionally abused: Not on file Physically abused: Not on file Forced sexual activity: Not on file Other Topics Concern  Not on file Social History Narrative  Not on file Review of Systems: 
 
 
Derm: open wound left posterior leg Musc/skeletal: no bone or joint complains. Vasc: positive pitting edema observed bilateral lower extremities Objective:  
 
Physical Assessment: 
 
Vitals:  
 19 1137 BP: 134/56 Pulse: 74 Resp: 19 Temp: 97.9 °F (36.6 °C) SpO2: 98% Lower Extremity Exam: 
 
Vascular Exam: 
Dorsalis Pedis Pulse: 2/4  Bilateral Lower Extremities Posterior Tibial Pulse: 2/4 Bilateral Lower Extremities Capillary Refill is < 3 seconds Bilateral Lower Extremities Temperature of extremity from proximal to distal is warm and perfused Bilateral Lower Extremities Integumentary Exam: 
Skin Texture is WNL Bilateral Lower Extremities Pedal Hair is present Bilateral Lower Extremities Examination of lower extremity reveals - ulcer left posterior ankle area- red and raw with brown dry scab covering around whole leg/foot; also reveals red dorsal left forefoot. Etiology: Started as a blister - Wound Description: Wound Type: Other Indication:   Chronic >30days Status:  no change Measurements: 
  Wound Length:  
 
  Wound Width :  
 
  Wound Depth :0.1cm Tissue Type: Ulcer bed: large area of red erythema Periwound: Surrounding erythema with intact dermal layer Exudate: Slight serous drainage noted Epithelial: with out necrosis Granulation: with out necrosis Subcutaneous: with out necrosis Slough: no 
Eschar: no 
Tendon: exposed no -with out necrosis Fascia: exposed no -with out necrosis Muscle: exposed no -with out necrosis Bone: exposed no -with out necrosis Drainage: Stable Debridement: -not required Infection: no 
Edema: yes   Both lower extremities Status:  increased Patient was educated on the importance of edema control today Compression: yes Status:  continue Type: farrow wraps Patient Compliant:  yes Offloading:  No  
 
Nicotine/Tobacco Use:  No 
 
Orthopedic Exam: Musculoskeletal Exam: Muscle strength is 5/5 Bilateral Lower Extremities. Tendon, Muscle and Bone and joints and WNL Bilateral Lower Extremities. Range of motion and strength noted in both ankles is WNL. Neurological Exam: Ankle deep tendon reflexes: Are WNL Bilateral Lower Extremities Epicritic and Protective Sensation: Are Intact Bilateral Lower Extremities Evaluation of lower extremity nerves: Are Intact as seen with 5.07 mm monofilament at 5 points per foot. Laboratory Results: 
@ Basic Metabolic Profile@ Lab Results Component Value Date  09/21/2017 CO2 29 09/21/2017 BUN 18 09/21/2017 Data Review: No results found for this or any previous visit (from the past 24 hour(s)). Assessment:  
 
80 y.o. male with There is no problem list on file for this patient. Open wound left posterior ankle area Recommendation:  
 
Plan:  
 
Plan/Procedure: 
 
Dispense prescription for Medrol Dose Jorge A. Needs : 
Good local wound care Edema management In wound care clinic today: 
Cleanse wound with commercial wound cleanser Apply the following topically to wound bed: timovate Apply the following to charly-wound: NA Apply the following dressings: profore Patient to return for wound care in: 7  Days Follow up with Nurse visit as recommended. PLEASE CONTACT OFFICE AS SOON AS POSSIBLE IF UNABLE TO MAKE THIS APPOINTMENT. Inspect your wounds, looking for signs of infection which may include the following:  Increase in redness  Red \"streaks\" from wound  Increase in swelling Fever  Unusual odor Change in the amount of wound drainage. Should you experience any significant changes in your wound(s) or have any questions regarding your home care instructions please contact the wound center or your home health company. If after regular business hours, please call your family doctor or local emergency room. Edema Control:   Elevate legs as much as possible. Avoid standing in one position for more than 10 minutes. Avoid setting with legs down. Do not cross legs while sitting.  
 
 
 
 
Signed By: Sagrario Hicks DPM 
  
 April 16, 2019, 4:39 PM

## 2019-04-24 NOTE — PROGRESS NOTES
[address] [facility] [address] Subjective: Chief Complaint: Humza Galdamez is a 80 y.o. male who returns to [facility] today, for continued treatment of ulcer on the back of his left ankle area and red rash on top of both feet. He presents today with the ulcer on the back of his left ankle all closed. Past Medical History:  
Diagnosis Date  Arthritis  Cancer ([address] skin cancer, squamous cell  H/O seasonal allergies  Hypercholesteremia Past Surgical History:  
Procedure Laterality Date  HX CATARACT REMOVAL Bilateral   
 HX HERNIA REPAIR Bilateral 2007  HX TONSILLECTOMY Current Medications: 
Prior to Admission medications Medication Sig Start Date End Date Taking? Authorizing Provider  
acetaminophen (TYLENOL) 325 mg tablet Take 2 Tabs by mouth every four (4) hours as needed for Pain. 9/22/17   Soledad Kay MD  
peg 400-propylene glycol (SYSTANE) 0.4-0.3 % drop Administer 1 Drop to both eyes as needed. Provider, Historical  
oxaprozin (DAYPRO) 600 mg tablet Take 600 mg by mouth two (2) times a day. Indications: OSTEOARTHRITIS    Provider, Historical  
cholecalciferol, vitamin D3, 2,000 unit tab Take  by mouth daily. Indications: PREVENTION OF VITAMIN D DEFICIENCY    Provider, Historical  
diphenhydrAMINE (BENADRYL) 25 mg tablet Take 25 mg by mouth nightly. Provider, Historical  
terazosin (HYTRIN) 5 mg capsule Take 5 mg by mouth nightly. Indications: SYMPTOMATIC BENIGN PROSTATIC HYPERPLASIA    Provider, Historical  
aMILoride-hydrochlorothiazide (MODURETIC) 5-50 mg tab Take 1 Tab by mouth daily. Indications: EDEMA    Provider, Historical  
simvastatin (ZOCOR) 20 mg tablet Take 20 mg by mouth nightly. Indications: HYPERCHOLESTEROLEMIA    Provider, Historical  
calcium-cholecalciferol, D3, (CALTRATE 600+D) tablet Take 1 Tab by mouth daily. Indications: HYPOCALCEMIA PREVENTION    Provider, Historical  
 
Allergies Allergen Reactions  Cephalexin Other (comments) Weakness and syncope History reviewed. No pertinent family history. Social History Socioeconomic History  Marital status:  Spouse name: Not on file  Number of children: Not on file  Years of education: Not on file  Highest education level: Not on file Occupational History  Not on file Social Needs  Financial resource strain: Not on file  Food insecurity:  
  Worry: Not on file Inability: Not on file  Transportation needs:  
  Medical: Not on file Non-medical: Not on file Tobacco Use  Smoking status: Former Smoker Last attempt to quit: 7/15/2005 Years since quittin.7  Smokeless tobacco: Never Used Substance and Sexual Activity  Alcohol use: No  
 Drug use: No  
 Sexual activity: Not Currently Lifestyle  Physical activity:  
  Days per week: Not on file Minutes per session: Not on file  Stress: Not on file Relationships  Social connections:  
  Talks on phone: Not on file Gets together: Not on file Attends Christianity service: Not on file Active member of club or organization: Not on file Attends meetings of clubs or organizations: Not on file Relationship status: Not on file  Intimate partner violence:  
  Fear of current or ex partner: Not on file Emotionally abused: Not on file Physically abused: Not on file Forced sexual activity: Not on file Other Topics Concern  Not on file Social History Narrative  Not on file Review of Systems: 
 
 
Derm: open wound left posterior leg- all closed; red rash on top of bilateral feet. Musc/skeletal: no bone or joint complains. Vasc: No edema observed bilateral lower extremities Objective:  
 
Physical Assessment: 
 
Vitals:  
 19 1137 BP: 134/56 Pulse: 74 Resp: 19 Temp: 97.9 °F (36.6 °C) SpO2: 98% Lower Extremity Exam: 
 
Vascular Exam: Dorsalis Pedis Pulse: 2/4  Bilateral Lower Extremities Posterior Tibial Pulse: 2/4 Bilateral Lower Extremities Capillary Refill is < 3 seconds Bilateral Lower Extremities Temperature of extremity from proximal to distal is warm and perfused Bilateral Lower Extremities Integumentary Exam: 
Skin Texture is WNL Bilateral Lower Extremities Pedal Hair is present Bilateral Lower Extremities Examination of lower extremity reveals - ulcer left posterior ankle area-all closed; eczema on top of bilateral feet. Wound Description: Wound Type: Other Indication:   Chronic >30days Status:  no change Measurements:  Wound is closed. Wound Length:  
 
  Wound Width :  
 
  Wound Depth : 
 
 Tissue Type: Ulcer bed: Wound is closed. Periwound: Surrounding erythema with intact dermal layer Exudate: none Epithelial: with out necrosis Granulation: with out necrosis Subcutaneous: with out necrosis Slough: no 
Eschar: no 
Tendon: exposed no -with out necrosis Fascia: exposed no -with out necrosis Muscle: exposed no -with out necrosis Bone: exposed no -with out necrosis Drainage: None Debridement: -not required Infection: no 
Edema: no 
  
Compression: yes Status:  continue Type: farrow wraps Patient Compliant:  yes Offloading:  No  
 
Nicotine/Tobacco Use:  No 
 
Orthopedic Exam: Musculoskeletal Exam: Muscle strength is 5/5 Bilateral Lower Extremities. Tendon, Muscle and Bone and joints and WNL Bilateral Lower Extremities. Range of motion and strength noted in both ankles is WNL. Neurological Exam: Ankle deep tendon reflexes: Are WNL Bilateral Lower Extremities Epicritic and Protective Sensation: Are Intact Bilateral Lower Extremities Evaluation of lower extremity nerves: Are Intact as seen with 5.07 mm monofilament at 5 points per foot. Laboratory Results: 
@ Basic Metabolic Profile@ Lab Results Component Value Date  09/21/2017 CO2 29 09/21/2017 BUN 18 09/21/2017 Data Review: No results found for this or any previous visit (from the past 24 hour(s)). Assessment:  
 
80 y.o. male with There is no problem list on file for this patient. Open wound left posterior ankle area- all closed Eczema on top of bilateral feet. Recommendation:  
 
Plan:  
 
Plan/Procedure: 
 
Ulcer is closed posterior left ankle area Apply Timovate cream bilateral feet Continue Farrow wraps bilateral 
 
Discharge patient- wound is closed Dispense prescription for Timovate to treat eczema bilateral feet Needs : 
 
Edema management In wound care clinic today: 
Cleanse wound with commercial wound cleanser Apply the following topically to wound bed: NA Apply the following to charly-wound: NA Apply the following dressings: Gemini Xiomara wraps -bilateral 
 
 
 
Follow up with Nurse visit as recommended. PLEASE CONTACT OFFICE AS SOON AS POSSIBLE IF UNABLE TO MAKE THIS APPOINTMENT. Inspect your wounds, looking for signs of infection which may include the following:  Increase in redness  Red \"streaks\" from wound  Increase in swelling Fever  Unusual odor Change in the amount of wound drainage. Should you experience any significant changes in your wound(s) or have any questions regarding your home care instructions please contact the wound center or your home health company. If after regular business hours, please call your family doctor or local emergency room. Edema Control:   Elevate legs as much as possible. Avoid standing in one position for more than 10 minutes. Avoid setting with legs down. Do not cross legs while sitting.  
 
 
 
 
Signed By: Yarely Stevens DPM 
  
 April 24, 2019, 4:39 PM

## 2019-06-26 ENCOUNTER — APPOINTMENT (OUTPATIENT)
Dept: PHYSICAL THERAPY | Age: 84
End: 2019-06-26

## 2019-07-25 ENCOUNTER — APPOINTMENT (OUTPATIENT)
Dept: PHYSICAL THERAPY | Age: 84
End: 2019-07-25

## 2020-07-09 NOTE — ED NOTES
Court Lee was discharged in good and improved condition. The patient's diagnosis, condition and treatment were explained to patient and written aftercare instructions were given. The patient verbalized good understanding. Patient armband removed and both labels and armband were placed in shred bin. Patient left ER via wheelchair in no acute distress. [employer]